# Patient Record
Sex: MALE | Race: BLACK OR AFRICAN AMERICAN | NOT HISPANIC OR LATINO | Employment: UNEMPLOYED | URBAN - METROPOLITAN AREA
[De-identification: names, ages, dates, MRNs, and addresses within clinical notes are randomized per-mention and may not be internally consistent; named-entity substitution may affect disease eponyms.]

---

## 2024-07-24 ENCOUNTER — CLINICAL SUPPORT (OUTPATIENT)
Dept: EMERGENCY MEDICINE | Facility: HOSPITAL | Age: 64
End: 2024-07-24
Payer: COMMERCIAL

## 2024-07-24 ENCOUNTER — HOSPITAL ENCOUNTER (OUTPATIENT)
Facility: HOSPITAL | Age: 64
Setting detail: OBSERVATION
Discharge: HOME | End: 2024-07-26
Attending: EMERGENCY MEDICINE | Admitting: INTERNAL MEDICINE
Payer: COMMERCIAL

## 2024-07-24 ENCOUNTER — APPOINTMENT (OUTPATIENT)
Dept: RADIOLOGY | Facility: HOSPITAL | Age: 64
End: 2024-07-24
Payer: COMMERCIAL

## 2024-07-24 DIAGNOSIS — I50.9 HEART FAILURE, UNSPECIFIED HF CHRONICITY, UNSPECIFIED HEART FAILURE TYPE (MULTI): ICD-10-CM

## 2024-07-24 DIAGNOSIS — R07.9 CHEST PAIN, UNSPECIFIED TYPE: Primary | ICD-10-CM

## 2024-07-24 LAB
ALBUMIN SERPL BCP-MCNC: 3.9 G/DL (ref 3.4–5)
ALP SERPL-CCNC: 93 U/L (ref 33–136)
ALT SERPL W P-5'-P-CCNC: 12 U/L (ref 10–52)
ANION GAP BLDV CALCULATED.4IONS-SCNC: 9 MMOL/L (ref 10–25)
ANION GAP SERPL CALC-SCNC: 14 MMOL/L
APTT PPP: 32 SECONDS (ref 27–38)
AST SERPL W P-5'-P-CCNC: 17 U/L (ref 9–39)
ATRIAL RATE: 59 BPM
ATRIAL RATE: 71 BPM
BASE EXCESS BLDV CALC-SCNC: 4.5 MMOL/L (ref -2–3)
BASOPHILS # BLD AUTO: 0.05 X10*3/UL (ref 0–0.1)
BASOPHILS NFR BLD AUTO: 0.7 %
BILIRUB SERPL-MCNC: 0.9 MG/DL (ref 0–1.2)
BODY TEMPERATURE: 37 DEGREES CELSIUS
BUN SERPL-MCNC: 14 MG/DL (ref 6–23)
CA-I BLDV-SCNC: 1.17 MMOL/L (ref 1.1–1.33)
CALCIUM SERPL-MCNC: 8.6 MG/DL (ref 8.6–10.6)
CARDIAC TROPONIN I PNL SERPL HS: 32 NG/L (ref 0–53)
CHLORIDE BLDV-SCNC: 100 MMOL/L (ref 98–107)
CHLORIDE SERPL-SCNC: 100 MMOL/L (ref 98–107)
CO2 SERPL-SCNC: 26 MMOL/L (ref 21–32)
CREAT SERPL-MCNC: 0.78 MG/DL (ref 0.5–1.3)
EGFRCR SERPLBLD CKD-EPI 2021: >90 ML/MIN/1.73M*2
EOSINOPHIL # BLD AUTO: 0.06 X10*3/UL (ref 0–0.7)
EOSINOPHIL NFR BLD AUTO: 0.9 %
ERYTHROCYTE [DISTWIDTH] IN BLOOD BY AUTOMATED COUNT: 17.2 % (ref 11.5–14.5)
GLUCOSE BLDV-MCNC: 92 MG/DL (ref 74–99)
GLUCOSE SERPL-MCNC: 110 MG/DL (ref 74–99)
HCO3 BLDV-SCNC: 29.2 MMOL/L (ref 22–26)
HCT VFR BLD AUTO: 40.5 % (ref 41–52)
HCT VFR BLD EST: 39 % (ref 41–52)
HGB BLD-MCNC: 13.1 G/DL (ref 13.5–17.5)
HGB BLDV-MCNC: 13.1 G/DL (ref 13.5–17.5)
IMM GRANULOCYTES # BLD AUTO: 0.02 X10*3/UL (ref 0–0.7)
IMM GRANULOCYTES NFR BLD AUTO: 0.3 % (ref 0–0.9)
INHALED O2 CONCENTRATION: 21 %
INR PPP: 1.4 (ref 0.9–1.1)
LACTATE BLDV-SCNC: 1.1 MMOL/L (ref 0.4–2)
LYMPHOCYTES # BLD AUTO: 2.08 X10*3/UL (ref 1.2–4.8)
LYMPHOCYTES NFR BLD AUTO: 30.7 %
MAGNESIUM SERPL-MCNC: 1.78 MG/DL (ref 1.6–2.4)
MCH RBC QN AUTO: 22.3 PG (ref 26–34)
MCHC RBC AUTO-ENTMCNC: 32.3 G/DL (ref 32–36)
MCV RBC AUTO: 69 FL (ref 80–100)
MONOCYTES # BLD AUTO: 0.77 X10*3/UL (ref 0.1–1)
MONOCYTES NFR BLD AUTO: 11.4 %
NEUTROPHILS # BLD AUTO: 3.79 X10*3/UL (ref 1.2–7.7)
NEUTROPHILS NFR BLD AUTO: 56 %
NRBC BLD-RTO: 0 /100 WBCS (ref 0–0)
OXYHGB MFR BLDV: 77.2 % (ref 45–75)
P AXIS: 41 DEGREES
P AXIS: 80 DEGREES
P OFFSET: 154 MS
P OFFSET: 179 MS
P ONSET: 95 MS
P ONSET: 99 MS
PCO2 BLDV: 43 MM HG (ref 41–51)
PH BLDV: 7.44 PH (ref 7.33–7.43)
PLATELET # BLD AUTO: 218 X10*3/UL (ref 150–450)
PO2 BLDV: 52 MM HG (ref 35–45)
POTASSIUM BLDV-SCNC: 2.6 MMOL/L (ref 3.5–5.3)
POTASSIUM SERPL-SCNC: 2.9 MMOL/L (ref 3.5–5.3)
PR INTERVAL: 230 MS
PR INTERVAL: 238 MS
PROT SERPL-MCNC: 7 G/DL (ref 6.4–8.2)
PROTHROMBIN TIME: 15.4 SECONDS (ref 9.8–12.8)
Q ONSET: 214 MS
Q ONSET: 214 MS
QRS COUNT: 11 BEATS
QRS COUNT: 12 BEATS
QRS DURATION: 130 MS
QRS DURATION: 130 MS
QT INTERVAL: 446 MS
QT INTERVAL: 448 MS
QTC CALCULATION(BAZETT): 483 MS
QTC CALCULATION(BAZETT): 484 MS
QTC FREDERICIA: 471 MS
QTC FREDERICIA: 472 MS
R AXIS: -17 DEGREES
R AXIS: -20 DEGREES
RBC # BLD AUTO: 5.87 X10*6/UL (ref 4.5–5.9)
SAO2 % BLDV: 79 % (ref 45–75)
SODIUM BLDV-SCNC: 136 MMOL/L (ref 136–145)
SODIUM SERPL-SCNC: 137 MMOL/L (ref 136–145)
T AXIS: 131 DEGREES
T AXIS: 139 DEGREES
T OFFSET: 437 MS
T OFFSET: 438 MS
VENTRICULAR RATE: 70 BPM
VENTRICULAR RATE: 71 BPM
WBC # BLD AUTO: 6.8 X10*3/UL (ref 4.4–11.3)

## 2024-07-24 PROCEDURE — 87635 SARS-COV-2 COVID-19 AMP PRB: CPT

## 2024-07-24 PROCEDURE — 83735 ASSAY OF MAGNESIUM: CPT

## 2024-07-24 PROCEDURE — 71046 X-RAY EXAM CHEST 2 VIEWS: CPT | Mod: FOREIGN READ | Performed by: RADIOLOGY

## 2024-07-24 PROCEDURE — 93005 ELECTROCARDIOGRAM TRACING: CPT

## 2024-07-24 PROCEDURE — 93010 ELECTROCARDIOGRAM REPORT: CPT | Performed by: EMERGENCY MEDICINE

## 2024-07-24 PROCEDURE — 85730 THROMBOPLASTIN TIME PARTIAL: CPT | Performed by: EMERGENCY MEDICINE

## 2024-07-24 PROCEDURE — 71046 X-RAY EXAM CHEST 2 VIEWS: CPT

## 2024-07-24 PROCEDURE — 99285 EMERGENCY DEPT VISIT HI MDM: CPT | Performed by: EMERGENCY MEDICINE

## 2024-07-24 PROCEDURE — 85610 PROTHROMBIN TIME: CPT | Performed by: EMERGENCY MEDICINE

## 2024-07-24 PROCEDURE — 84075 ASSAY ALKALINE PHOSPHATASE: CPT

## 2024-07-24 PROCEDURE — 36415 COLL VENOUS BLD VENIPUNCTURE: CPT

## 2024-07-24 PROCEDURE — 85025 COMPLETE CBC W/AUTO DIFF WBC: CPT

## 2024-07-24 PROCEDURE — 84484 ASSAY OF TROPONIN QUANT: CPT

## 2024-07-24 PROCEDURE — 36415 COLL VENOUS BLD VENIPUNCTURE: CPT | Performed by: EMERGENCY MEDICINE

## 2024-07-24 PROCEDURE — 83880 ASSAY OF NATRIURETIC PEPTIDE: CPT

## 2024-07-24 PROCEDURE — 84132 ASSAY OF SERUM POTASSIUM: CPT

## 2024-07-24 PROCEDURE — 99285 EMERGENCY DEPT VISIT HI MDM: CPT | Mod: CS

## 2024-07-24 RX ORDER — POTASSIUM CHLORIDE 14.9 MG/ML
20 INJECTION INTRAVENOUS
Status: COMPLETED | OUTPATIENT
Start: 2024-07-24 | End: 2024-07-25

## 2024-07-24 RX ORDER — POTASSIUM CHLORIDE 1.5 G/1.58G
40 POWDER, FOR SOLUTION ORAL ONCE
Status: COMPLETED | OUTPATIENT
Start: 2024-07-24 | End: 2024-07-25

## 2024-07-24 RX ORDER — MAGNESIUM SULFATE HEPTAHYDRATE 40 MG/ML
2 INJECTION, SOLUTION INTRAVENOUS ONCE
Status: COMPLETED | OUTPATIENT
Start: 2024-07-24 | End: 2024-07-25

## 2024-07-24 ASSESSMENT — LIFESTYLE VARIABLES
HAVE YOU EVER FELT YOU SHOULD CUT DOWN ON YOUR DRINKING: NO
HAVE PEOPLE ANNOYED YOU BY CRITICIZING YOUR DRINKING: NO
EVER HAD A DRINK FIRST THING IN THE MORNING TO STEADY YOUR NERVES TO GET RID OF A HANGOVER: NO
EVER FELT BAD OR GUILTY ABOUT YOUR DRINKING: NO
TOTAL SCORE: 0

## 2024-07-24 ASSESSMENT — COLUMBIA-SUICIDE SEVERITY RATING SCALE - C-SSRS
1. IN THE PAST MONTH, HAVE YOU WISHED YOU WERE DEAD OR WISHED YOU COULD GO TO SLEEP AND NOT WAKE UP?: NO
2. HAVE YOU ACTUALLY HAD ANY THOUGHTS OF KILLING YOURSELF?: NO
6. HAVE YOU EVER DONE ANYTHING, STARTED TO DO ANYTHING, OR PREPARED TO DO ANYTHING TO END YOUR LIFE?: NO

## 2024-07-24 ASSESSMENT — PAIN DESCRIPTION - ONSET: ONSET: ONGOING

## 2024-07-24 ASSESSMENT — PAIN DESCRIPTION - DESCRIPTORS
DESCRIPTORS: SHARP
DESCRIPTORS: SHARP

## 2024-07-24 ASSESSMENT — PAIN SCALES - GENERAL
PAINLEVEL_OUTOF10: 10 - WORST POSSIBLE PAIN
PAINLEVEL_OUTOF10: 10 - WORST POSSIBLE PAIN

## 2024-07-24 ASSESSMENT — PAIN DESCRIPTION - LOCATION: LOCATION: CHEST

## 2024-07-24 ASSESSMENT — PAIN DESCRIPTION - PAIN TYPE: TYPE: ACUTE PAIN

## 2024-07-24 ASSESSMENT — PAIN DESCRIPTION - DIRECTION: RADIATING_TOWARDS: LEFT ARM

## 2024-07-24 ASSESSMENT — PAIN DESCRIPTION - ORIENTATION: ORIENTATION: MID

## 2024-07-24 ASSESSMENT — PAIN - FUNCTIONAL ASSESSMENT: PAIN_FUNCTIONAL_ASSESSMENT: 0-10

## 2024-07-25 ENCOUNTER — APPOINTMENT (OUTPATIENT)
Dept: RADIOLOGY | Facility: HOSPITAL | Age: 64
End: 2024-07-25
Payer: COMMERCIAL

## 2024-07-25 ENCOUNTER — CLINICAL SUPPORT (OUTPATIENT)
Dept: EMERGENCY MEDICINE | Facility: HOSPITAL | Age: 64
End: 2024-07-25
Payer: COMMERCIAL

## 2024-07-25 ENCOUNTER — APPOINTMENT (OUTPATIENT)
Dept: CARDIOLOGY | Facility: HOSPITAL | Age: 64
End: 2024-07-25
Payer: COMMERCIAL

## 2024-07-25 PROBLEM — R07.9 CHEST PAIN: Status: ACTIVE | Noted: 2024-07-25

## 2024-07-25 LAB
ANION GAP BLDV CALCULATED.4IONS-SCNC: 9 MMOL/L (ref 10–25)
AORTIC VALVE MEAN GRADIENT: 1.9 MMHG
AORTIC VALVE PEAK VELOCITY: 0.89 M/S
ATRIAL RATE: 66 BPM
AV PEAK GRADIENT: 3.1 MMHG
AVA (PEAK VEL): 3.08 CM2
AVA (VTI): 3.27 CM2
BASE EXCESS BLDV CALC-SCNC: 3 MMOL/L (ref -2–3)
BNP SERPL-MCNC: 137 PG/ML (ref 0–99)
BODY TEMPERATURE: 37 DEGREES CELSIUS
CA-I BLDV-SCNC: 1.14 MMOL/L (ref 1.1–1.33)
CARDIAC TROPONIN I PNL SERPL HS: 33 NG/L (ref 0–53)
CARDIAC TROPONIN I PNL SERPL HS: 35 NG/L (ref 0–53)
CHLORIDE BLDV-SCNC: 102 MMOL/L (ref 98–107)
EJECTION FRACTION APICAL 4 CHAMBER: 7
EJECTION FRACTION: 21 %
GLUCOSE BLDV-MCNC: 94 MG/DL (ref 74–99)
HCO3 BLDV-SCNC: 27.9 MMOL/L (ref 22–26)
HCT VFR BLD EST: 38 % (ref 41–52)
HGB BLDV-MCNC: 12.7 G/DL (ref 13.5–17.5)
INHALED O2 CONCENTRATION: 21 %
LACTATE BLDV-SCNC: 1 MMOL/L (ref 0.4–2)
LEFT ATRIUM VOLUME AREA LENGTH INDEX BSA: 53.9 ML/M2
LEFT VENTRICLE INTERNAL DIMENSION DIASTOLE: 5.65 CM (ref 3.5–6)
LEFT VENTRICULAR OUTFLOW TRACT DIAMETER: 2.12 CM
MITRAL VALVE E/A RATIO: 1.32
MITRAL VALVE E/E' RATIO: 23.55
OXYHGB MFR BLDV: 72.8 % (ref 45–75)
P AXIS: 97 DEGREES
P OFFSET: 130 MS
P ONSET: 58 MS
PCO2 BLDV: 43 MM HG (ref 41–51)
PH BLDV: 7.42 PH (ref 7.33–7.43)
PO2 BLDV: 48 MM HG (ref 35–45)
POTASSIUM BLDV-SCNC: 3.6 MMOL/L (ref 3.5–5.3)
POTASSIUM SERPL-SCNC: 3.5 MMOL/L (ref 3.5–5.3)
PR INTERVAL: 310 MS
Q ONSET: 213 MS
QRS COUNT: 11 BEATS
QRS DURATION: 122 MS
QT INTERVAL: 448 MS
QTC CALCULATION(BAZETT): 469 MS
QTC FREDERICIA: 462 MS
R AXIS: 87 DEGREES
RIGHT VENTRICLE FREE WALL PEAK S': 7 CM/S
SAO2 % BLDV: 75 % (ref 45–75)
SARS-COV-2 RNA RESP QL NAA+PROBE: NOT DETECTED
SODIUM BLDV-SCNC: 135 MMOL/L (ref 136–145)
T AXIS: -83 DEGREES
T OFFSET: 437 MS
TRICUSPID ANNULAR PLANE SYSTOLIC EXCURSION: 1.8 CM
VENTRICULAR RATE: 66 BPM

## 2024-07-25 PROCEDURE — 96372 THER/PROPH/DIAG INJ SC/IM: CPT

## 2024-07-25 PROCEDURE — 93306 TTE W/DOPPLER COMPLETE: CPT | Performed by: STUDENT IN AN ORGANIZED HEALTH CARE EDUCATION/TRAINING PROGRAM

## 2024-07-25 PROCEDURE — 93005 ELECTROCARDIOGRAM TRACING: CPT

## 2024-07-25 PROCEDURE — 96366 THER/PROPH/DIAG IV INF ADDON: CPT

## 2024-07-25 PROCEDURE — 74174 CTA ABD&PLVS W/CONTRAST: CPT | Performed by: RADIOLOGY

## 2024-07-25 PROCEDURE — 96365 THER/PROPH/DIAG IV INF INIT: CPT | Mod: 59

## 2024-07-25 PROCEDURE — 84484 ASSAY OF TROPONIN QUANT: CPT

## 2024-07-25 PROCEDURE — 2550000001 HC RX 255 CONTRASTS: Performed by: EMERGENCY MEDICINE

## 2024-07-25 PROCEDURE — 2500000001 HC RX 250 WO HCPCS SELF ADMINISTERED DRUGS (ALT 637 FOR MEDICARE OP)

## 2024-07-25 PROCEDURE — 72125 CT NECK SPINE W/O DYE: CPT

## 2024-07-25 PROCEDURE — 84132 ASSAY OF SERUM POTASSIUM: CPT

## 2024-07-25 PROCEDURE — 71275 CT ANGIOGRAPHY CHEST: CPT | Performed by: RADIOLOGY

## 2024-07-25 PROCEDURE — 93010 ELECTROCARDIOGRAM REPORT: CPT | Performed by: PHYSICIAN ASSISTANT

## 2024-07-25 PROCEDURE — 2500000004 HC RX 250 GENERAL PHARMACY W/ HCPCS (ALT 636 FOR OP/ED)

## 2024-07-25 PROCEDURE — G0378 HOSPITAL OBSERVATION PER HR: HCPCS

## 2024-07-25 PROCEDURE — 93306 TTE W/DOPPLER COMPLETE: CPT

## 2024-07-25 PROCEDURE — 72125 CT NECK SPINE W/O DYE: CPT | Performed by: RADIOLOGY

## 2024-07-25 PROCEDURE — 2500000002 HC RX 250 W HCPCS SELF ADMINISTERED DRUGS (ALT 637 FOR MEDICARE OP, ALT 636 FOR OP/ED)

## 2024-07-25 PROCEDURE — 36415 COLL VENOUS BLD VENIPUNCTURE: CPT

## 2024-07-25 PROCEDURE — 97162 PT EVAL MOD COMPLEX 30 MIN: CPT | Mod: GP

## 2024-07-25 PROCEDURE — 96375 TX/PRO/DX INJ NEW DRUG ADDON: CPT | Mod: 59

## 2024-07-25 PROCEDURE — 70450 CT HEAD/BRAIN W/O DYE: CPT

## 2024-07-25 PROCEDURE — 71275 CT ANGIOGRAPHY CHEST: CPT

## 2024-07-25 PROCEDURE — 2500000005 HC RX 250 GENERAL PHARMACY W/O HCPCS

## 2024-07-25 PROCEDURE — 70450 CT HEAD/BRAIN W/O DYE: CPT | Performed by: RADIOLOGY

## 2024-07-25 PROCEDURE — 96361 HYDRATE IV INFUSION ADD-ON: CPT

## 2024-07-25 PROCEDURE — 96368 THER/DIAG CONCURRENT INF: CPT

## 2024-07-25 PROCEDURE — 96376 TX/PRO/DX INJ SAME DRUG ADON: CPT | Mod: 59

## 2024-07-25 RX ORDER — ATORVASTATIN CALCIUM 80 MG/1
80 TABLET, FILM COATED ORAL DAILY
COMMUNITY

## 2024-07-25 RX ORDER — ONDANSETRON HYDROCHLORIDE 2 MG/ML
4 INJECTION, SOLUTION INTRAVENOUS EVERY 6 HOURS PRN
Status: DISCONTINUED | OUTPATIENT
Start: 2024-07-25 | End: 2024-07-26 | Stop reason: HOSPADM

## 2024-07-25 RX ORDER — CARVEDILOL 3.12 MG/1
3.12 TABLET ORAL 2 TIMES DAILY
Status: DISCONTINUED | OUTPATIENT
Start: 2024-07-25 | End: 2024-07-26 | Stop reason: HOSPADM

## 2024-07-25 RX ORDER — FUROSEMIDE 40 MG/1
20 TABLET ORAL
Status: DISCONTINUED | OUTPATIENT
Start: 2024-07-25 | End: 2024-07-26 | Stop reason: HOSPADM

## 2024-07-25 RX ORDER — ENOXAPARIN SODIUM 100 MG/ML
40 INJECTION SUBCUTANEOUS EVERY 24 HOURS
Status: DISCONTINUED | OUTPATIENT
Start: 2024-07-25 | End: 2024-07-26 | Stop reason: HOSPADM

## 2024-07-25 RX ORDER — CARVEDILOL 3.12 MG/1
3.12 TABLET ORAL 2 TIMES DAILY
COMMUNITY

## 2024-07-25 RX ORDER — FUROSEMIDE 40 MG/1
20 TABLET ORAL
Status: DISCONTINUED | OUTPATIENT
Start: 2024-07-25 | End: 2024-07-25

## 2024-07-25 RX ORDER — MAGNESIUM OXIDE 420 MG/1
1 TABLET ORAL DAILY
COMMUNITY

## 2024-07-25 RX ORDER — ACETAMINOPHEN 325 MG/1
975 TABLET ORAL EVERY 8 HOURS PRN
Status: DISCONTINUED | OUTPATIENT
Start: 2024-07-25 | End: 2024-07-26 | Stop reason: HOSPADM

## 2024-07-25 RX ORDER — NITROGLYCERIN 0.4 MG/1
0.4 TABLET SUBLINGUAL ONCE
Status: COMPLETED | OUTPATIENT
Start: 2024-07-25 | End: 2024-07-25

## 2024-07-25 RX ORDER — POTASSIUM CHLORIDE 20 MEQ/1
20 TABLET, EXTENDED RELEASE ORAL DAILY
Status: DISCONTINUED | OUTPATIENT
Start: 2024-07-25 | End: 2024-07-26 | Stop reason: HOSPADM

## 2024-07-25 RX ORDER — CHOLECALCIFEROL (VITAMIN D3) 50 MCG
50 TABLET ORAL DAILY
COMMUNITY

## 2024-07-25 RX ORDER — ONDANSETRON HYDROCHLORIDE 2 MG/ML
4 INJECTION, SOLUTION INTRAVENOUS ONCE
Status: COMPLETED | OUTPATIENT
Start: 2024-07-26 | End: 2024-07-26

## 2024-07-25 RX ORDER — FUROSEMIDE 20 MG/1
20 TABLET ORAL
Status: ON HOLD | COMMUNITY
End: 2024-07-26

## 2024-07-25 RX ORDER — POLYETHYLENE GLYCOL 3350 17 G/17G
17 POWDER, FOR SOLUTION ORAL DAILY
Status: DISCONTINUED | OUTPATIENT
Start: 2024-07-26 | End: 2024-07-26 | Stop reason: HOSPADM

## 2024-07-25 RX ORDER — TAMSULOSIN HYDROCHLORIDE 0.4 MG/1
0.4 CAPSULE ORAL DAILY
Status: DISCONTINUED | OUTPATIENT
Start: 2024-07-25 | End: 2024-07-25

## 2024-07-25 RX ORDER — ATORVASTATIN CALCIUM 80 MG/1
80 TABLET, FILM COATED ORAL DAILY
Status: DISCONTINUED | OUTPATIENT
Start: 2024-07-25 | End: 2024-07-25 | Stop reason: SDUPTHER

## 2024-07-25 RX ORDER — TAMSULOSIN HYDROCHLORIDE 0.4 MG/1
0.4 CAPSULE ORAL NIGHTLY
COMMUNITY

## 2024-07-25 RX ORDER — LIDOCAINE 560 MG/1
1 PATCH PERCUTANEOUS; TOPICAL; TRANSDERMAL DAILY
Status: DISCONTINUED | OUTPATIENT
Start: 2024-07-25 | End: 2024-07-26 | Stop reason: HOSPADM

## 2024-07-25 RX ORDER — FUROSEMIDE 10 MG/ML
20 INJECTION INTRAMUSCULAR; INTRAVENOUS 2 TIMES DAILY
Status: DISCONTINUED | OUTPATIENT
Start: 2024-07-25 | End: 2024-07-25

## 2024-07-25 RX ORDER — NITROGLYCERIN 0.4 MG/1
0.4 TABLET SUBLINGUAL EVERY 5 MIN PRN
Status: DISCONTINUED | OUTPATIENT
Start: 2024-07-25 | End: 2024-07-25

## 2024-07-25 RX ORDER — FUROSEMIDE 40 MG/1
TABLET ORAL
Status: COMPLETED
Start: 2024-07-25 | End: 2024-07-25

## 2024-07-25 RX ORDER — SACUBITRIL AND VALSARTAN 24; 26 MG/1; MG/1
1 TABLET, FILM COATED ORAL 2 TIMES DAILY
COMMUNITY

## 2024-07-25 RX ORDER — ASPIRIN 81 MG/1
81 TABLET ORAL DAILY
Status: DISCONTINUED | OUTPATIENT
Start: 2024-07-25 | End: 2024-07-26 | Stop reason: HOSPADM

## 2024-07-25 RX ORDER — AMOXICILLIN 250 MG
2 CAPSULE ORAL NIGHTLY PRN
Status: DISCONTINUED | OUTPATIENT
Start: 2024-07-25 | End: 2024-07-26 | Stop reason: HOSPADM

## 2024-07-25 RX ORDER — ATORVASTATIN CALCIUM 80 MG/1
80 TABLET, FILM COATED ORAL NIGHTLY
Status: DISCONTINUED | OUTPATIENT
Start: 2024-07-25 | End: 2024-07-26 | Stop reason: HOSPADM

## 2024-07-25 RX ORDER — ACETAMINOPHEN 325 MG/1
975 TABLET ORAL ONCE
Status: COMPLETED | OUTPATIENT
Start: 2024-07-25 | End: 2024-07-25

## 2024-07-25 RX ORDER — THYROID 60 MG/1
60 TABLET ORAL
Status: DISCONTINUED | OUTPATIENT
Start: 2024-07-26 | End: 2024-07-25 | Stop reason: ENTERED-IN-ERROR

## 2024-07-25 RX ORDER — CARVEDILOL 3.12 MG/1
3.12 TABLET ORAL 2 TIMES DAILY
Status: DISCONTINUED | OUTPATIENT
Start: 2024-07-25 | End: 2024-07-25 | Stop reason: SDUPTHER

## 2024-07-25 RX ORDER — FLUTICASONE PROPIONATE 50 MCG
1 SPRAY, SUSPENSION (ML) NASAL DAILY PRN
COMMUNITY
End: 2024-07-25 | Stop reason: WASHOUT

## 2024-07-25 RX ORDER — SPIRONOLACTONE 25 MG/1
12.5 TABLET ORAL DAILY
COMMUNITY

## 2024-07-25 RX ORDER — SPIRONOLACTONE 25 MG/1
12.5 TABLET ORAL DAILY
Status: DISCONTINUED | OUTPATIENT
Start: 2024-07-25 | End: 2024-07-25 | Stop reason: SDUPTHER

## 2024-07-25 RX ORDER — FUROSEMIDE 20 MG/1
20 TABLET ORAL
Status: DISCONTINUED | OUTPATIENT
Start: 2024-07-25 | End: 2024-07-25

## 2024-07-25 RX ORDER — MORPHINE SULFATE 4 MG/ML
2 INJECTION INTRAVENOUS ONCE
Status: COMPLETED | OUTPATIENT
Start: 2024-07-25 | End: 2024-07-25

## 2024-07-25 RX ORDER — ACETAMINOPHEN AND CODEINE PHOSPHATE 300; 30 MG/1; MG/1
TABLET ORAL
COMMUNITY

## 2024-07-25 RX ORDER — SPIRONOLACTONE 25 MG/1
12.5 TABLET ORAL DAILY
Status: DISCONTINUED | OUTPATIENT
Start: 2024-07-25 | End: 2024-07-26 | Stop reason: HOSPADM

## 2024-07-25 RX ORDER — POTASSIUM CHLORIDE 20 MEQ/1
20 TABLET, EXTENDED RELEASE ORAL DAILY
Status: ON HOLD | COMMUNITY
End: 2024-07-26

## 2024-07-25 RX ORDER — ASPIRIN 81 MG/1
81 TABLET ORAL DAILY
COMMUNITY

## 2024-07-25 RX ORDER — OXYCODONE HYDROCHLORIDE 5 MG/1
5 TABLET ORAL EVERY 6 HOURS PRN
Status: DISCONTINUED | OUTPATIENT
Start: 2024-07-25 | End: 2024-07-26 | Stop reason: HOSPADM

## 2024-07-25 RX ORDER — NAPROXEN 500 MG/1
500 TABLET ORAL 2 TIMES DAILY PRN
COMMUNITY

## 2024-07-25 RX ORDER — TAMSULOSIN HYDROCHLORIDE 0.4 MG/1
0.4 CAPSULE ORAL NIGHTLY
Status: DISCONTINUED | OUTPATIENT
Start: 2024-07-25 | End: 2024-07-26 | Stop reason: HOSPADM

## 2024-07-25 SDOH — SOCIAL STABILITY: SOCIAL INSECURITY: DO YOU FEEL UNSAFE GOING BACK TO THE PLACE WHERE YOU ARE LIVING?: NO

## 2024-07-25 SDOH — SOCIAL STABILITY: SOCIAL INSECURITY: DOES ANYONE TRY TO KEEP YOU FROM HAVING/CONTACTING OTHER FRIENDS OR DOING THINGS OUTSIDE YOUR HOME?: NO

## 2024-07-25 SDOH — SOCIAL STABILITY: SOCIAL INSECURITY: DO YOU FEEL ANYONE HAS EXPLOITED OR TAKEN ADVANTAGE OF YOU FINANCIALLY OR OF YOUR PERSONAL PROPERTY?: NO

## 2024-07-25 SDOH — SOCIAL STABILITY: SOCIAL INSECURITY: HAVE YOU HAD ANY THOUGHTS OF HARMING ANYONE ELSE?: NO

## 2024-07-25 SDOH — SOCIAL STABILITY: SOCIAL INSECURITY: ABUSE: ADULT

## 2024-07-25 SDOH — SOCIAL STABILITY: SOCIAL INSECURITY: ARE THERE ANY APPARENT SIGNS OF INJURIES/BEHAVIORS THAT COULD BE RELATED TO ABUSE/NEGLECT?: NO

## 2024-07-25 SDOH — SOCIAL STABILITY: SOCIAL INSECURITY: HAS ANYONE EVER THREATENED TO HURT YOUR FAMILY OR YOUR PETS?: NO

## 2024-07-25 SDOH — SOCIAL STABILITY: SOCIAL INSECURITY: ARE YOU OR HAVE YOU BEEN THREATENED OR ABUSED PHYSICALLY, EMOTIONALLY, OR SEXUALLY BY ANYONE?: NO

## 2024-07-25 ASSESSMENT — ENCOUNTER SYMPTOMS
DIZZINESS: 0
CONSTIPATION: 0
ACTIVITY CHANGE: 0
SORE THROAT: 0
FEVER: 0
LIGHT-HEADEDNESS: 0
VOMITING: 1
SHORTNESS OF BREATH: 0
DYSURIA: 0
NAUSEA: 1
UNEXPECTED WEIGHT CHANGE: 0
COUGH: 0
CHEST TIGHTNESS: 0
HEADACHES: 0
ABDOMINAL PAIN: 0
CHILLS: 0
BLOOD IN STOOL: 0
DIARRHEA: 0
FREQUENCY: 0
POLYDIPSIA: 0

## 2024-07-25 ASSESSMENT — PAIN SCALES - GENERAL
PAINLEVEL_OUTOF10: 10 - WORST POSSIBLE PAIN
PAINLEVEL_OUTOF10: 10 - WORST POSSIBLE PAIN
PAINLEVEL_OUTOF10: 5 - MODERATE PAIN
PAINLEVEL_OUTOF10: 0 - NO PAIN
PAINLEVEL_OUTOF10: 10 - WORST POSSIBLE PAIN
PAINLEVEL_OUTOF10: 10 - WORST POSSIBLE PAIN
PAINLEVEL_OUTOF10: 6

## 2024-07-25 ASSESSMENT — COGNITIVE AND FUNCTIONAL STATUS - GENERAL
TOILETING: A LITTLE
WALKING IN HOSPITAL ROOM: A LITTLE
MOVING FROM LYING ON BACK TO SITTING ON SIDE OF FLAT BED WITH BEDRAILS: A LITTLE
STANDING UP FROM CHAIR USING ARMS: A LITTLE
MOVING TO AND FROM BED TO CHAIR: A LITTLE
DRESSING REGULAR UPPER BODY CLOTHING: A LITTLE
STANDING UP FROM CHAIR USING ARMS: A LITTLE
CLIMB 3 TO 5 STEPS WITH RAILING: A LITTLE
DRESSING REGULAR LOWER BODY CLOTHING: A LITTLE
TURNING FROM BACK TO SIDE WHILE IN FLAT BAD: A LITTLE
MOBILITY SCORE: 22
MOBILITY SCORE: 18
DAILY ACTIVITIY SCORE: 21
CLIMB 3 TO 5 STEPS WITH RAILING: A LITTLE

## 2024-07-25 ASSESSMENT — HEART SCORE
RISK FACTORS: >2 RISK FACTORS OR HX OF ATHEROSCLEROTIC DISEASE
ECG: NON-SPECIFIC REPOLARIZATION DISTURBANCE
HISTORY: SLIGHTLY SUSPICIOUS
AGE: 65+
TROPONIN: LESS THAN OR EQUAL TO NORMAL LIMIT
HEART SCORE: 5

## 2024-07-25 ASSESSMENT — PAIN - FUNCTIONAL ASSESSMENT
PAIN_FUNCTIONAL_ASSESSMENT: 0-10

## 2024-07-25 ASSESSMENT — ACTIVITIES OF DAILY LIVING (ADL)
FEEDING YOURSELF: INDEPENDENT
HEARING - RIGHT EAR: FUNCTIONAL
PATIENT'S MEMORY ADEQUATE TO SAFELY COMPLETE DAILY ACTIVITIES?: YES
TOILETING: INDEPENDENT
GROOMING: INDEPENDENT
DRESSING YOURSELF: INDEPENDENT
BATHING: INDEPENDENT
WALKS IN HOME: INDEPENDENT
JUDGMENT_ADEQUATE_SAFELY_COMPLETE_DAILY_ACTIVITIES: YES
ADEQUATE_TO_COMPLETE_ADL: YES
ADL_ASSISTANCE: INDEPENDENT
ADEQUATE_TO_COMPLETE_ADL: YES
HEARING - LEFT EAR: FUNCTIONAL

## 2024-07-25 ASSESSMENT — PAIN DESCRIPTION - LOCATION
LOCATION: CHEST
LOCATION: CHEST

## 2024-07-25 ASSESSMENT — PAIN DESCRIPTION - ORIENTATION: ORIENTATION: MID

## 2024-07-25 NOTE — HOSPITAL COURSE
Nigel Chew is a 63 y.o. 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 Patient had a 1-week history of substernal chest pain with associated nausea/vomiting and one episode of syncope. Patient reports not eating and drinking well over the past week and has not been taking home medications for that same time. Presentation and workup non-concerning for ACS with negative troponin and EKG without acute ischemic changes. Patient has a history of HrEF diagnosed in 12/2023.  LHC at the same time revealed no obstructive or nonobstructive CAD.  TTE 12/2023 with an EF of 25-30%, global ventricular hypokinesis, and moderate right atrial dilatation. [facility]: predominant rhythm was sinus rhythm. First degree AV block was present. Non sustained VT. Non sustained SVT. Frequent, isolated PACs. Frequent, isolated PVCs (7.9%).    Physical exam on admission significant for pain upon palpation of the sternum without signs of heart failure exacerbation such as lower extremity edema, weight gain, and JVD. . CTA showing cardiomegaly and small pericardial effusion. TTE showing EF 21%, global hypokinesis, and moderate pericardial effusion without concerns for cardiac tamponade.  Concerns for dehydration upon admission given positive orthostatics.     On the floor, patient was given 500 mL bolus LR due to concerns for dehydration with positive orthostatics. Home Lasix was held. Restarted home GDMT. Pain persisted throughout the night. Cardiac MRI ordered 7/26, showing extensive delayed enhancement which predominantly appears to be mid myocardial throughout the mid to basal segments as described and greatest within the lateral wall overall in keeping with a nonspecific pattern. Some areas  of the delayed enhancement do appear to involve the subendocardium in the lateral wall and areas of infarction are difficult to entirely exclude. The diffusely increased T1 mapping times and upper limits of normal to borderline increased T2 mapping times are suggestive of an underlying infiltrative process. Approximate scar size = 21%.    Patient's chest pain began to gradually improve on analgesics. No syncopal or pre-syncopal events throughout his admission. Patient was medically stable and appropriate for discharge. Patient agreeable to discharge and expresses agreement to follow-up with outpatient providers.

## 2024-07-25 NOTE — SIGNIFICANT EVENT
Senior Staffing Note    History Of Present Illness  Nigel Chew is a 63 y.o. male with PMH of HFrEF (25-30% [facility]; 2/2 NIC), Hx of CVA 2012 with left sided weakness who is admitted to cardiology for syncope and chest pain.    Patient reports that he had sudden onset chest pain 1 week ago. It is in the middle of his chest, not related to exertion, reproducible on palpation. He also had N/V. He said the pain is similar to the pain he had when [facility] was [facility], when he LHC showed normal coronaries. He said the pain back then resolved spontaneously.     He reports having nausea and that as a result has not been drinking or eating much. He also has not been his meds. He reported yesterday he tried to sit up from a seating position, lost consciousness and fell, did no his head. Not on blood thinners. Denies palpitations or shortness.     His current weight is 201lbs, dry weight is 205lbs per [facility] records.     ED Course:  VS: T 36.9  /105 HR 71 RR 14 O2 97 on RA    Labs:  CBC WBC 6.8 Hb 13.1 Plt 218  RFP Na 137 K 2.9 (repeat 3.5) Cl 100 HCO3 26 BUN 14 SCr 0.78 Mg 1.78  LFTs AST/ALT 17/12 Alk Phos 93 T Bili 0.9   Trop 32->35    Imaging:   EKG sinus with first degree AVB (HR 66), T-wave inversions in inferior and lateral leads, no significant changes compared to [facility]    CXR No acute process.    CT HEAD:  1. No acute intracranial abnormality or calvarial fracture.  2. Large area of encephalomalacia within the right MCA distribution,  consistent with history of prior stroke.    CT CERVICAL SPINE:  1. No acute fracture or traumatic malalignment of the cervical spine.  2. Multilevel degenerative changes of the cervical spine as described  above.    CTA CAP:  IMPRESSION:  No thoracic or abdominal aortic aneurysm or acute aortic pathology.    Enlarged main pulmonary artery measuring up to 3.2 cm. Findings may  be seen with pulmonary arterial hypertension.    Cardiomegaly and small  pericardial effusion.    Thickened interlobular septal markings and scattered ground-glass  opacities favored to represent pulmonary edema. Superimposed  infectious or inflammatory processes are not excluded.    Partially enhancing hepatic lesions which may represent hemangiomas,  however, are incompletely characterize. Recommend nonemergent MRI to  further characterize.    Age indeterminate compression deformity of the L5 vertebral body with  irregularity of the inferior endplate. Correlate with point  tenderness.    TTE:  1. The left ventricular systolic function is severely decreased, with a Trujillo's biplane calculated ejection fraction of 21%.  2. There is global hypokinesis of the left ventricle with minor regional variations.  3. Spectral Doppler shows a pseudonormal pattern of left ventricular diastolic filling.  4. Left ventricular cavity size is mild to moderately dilated.  5. Mildly enlarged right ventricle.  6. There is mildly reduced right ventricular systolic function.  7. The left atrium is severely dilated.  8. There is a moderate pericardial effusion.    Interventions:   Potassium 40, mag 2mg    Cardiac Hx:  [facility]; LHC was normal; TTE showed EF of 25-30%.           Physical Exam  General: awake, alert, conversant, appears stated age  Cardiac: RRR, normal S1, S2, no M/R/G  Pulm: CTAB, normal respiratory effort, on room air  Abdomen: soft, ND, NT, no involuntary guarding or rebound tenderness  : no flank pain or indwelling urinary catheter  EXT: no peripheral edema, no asymmetry noted  Neuro: AOx3, complete left hemiparesis   Psych: coherent thought process, appropriate mood and affect      Last Recorded Vitals  /88 (Patient Position: Lying)   Pulse 82   Temp 36.5 °C (97.7 °F) (Oral)   Resp 17   Wt 91.2 kg (201 lb)   SpO2 97%          Assessment/Plan   Principal Problem:    Chest pain    Nigel Chew is a 63 y.o. male with PMH of HFrEF (25-30% [facility];  2/2 NICM), Hx of CVA 2012 with left sided weakness who is admitted to cardiology for syncope and chest pain. Pain description, in addition to normal troponin and EKG, recent normal LHC, likely indicative of costochondritis, but will make NPO for possible ischemic eval tomorrow. For syncope, patient has been having nausea and appears dry on exam, will check orthostats and give gentle hydration.     #Chest pain  ::[facility]: normal coronaries, EKG no changes, trop 32->35->33  ::reproducible on palpation, not related to exertion  -lidocaine patches, Tylenol, PRN oxycodone for severe pain  -NPO for possible ischemic eval tomorrow    #Syncope  ::Appears dry on exam, TTE with small and collapsible IVC  ::poor PO intake recently d/t nausea  ::Likely orthostatic syncope  ::noted to have moderate pericardial effusion on TTE but no reported signs of tamponade  -check orthostatic vitals  -gentle hydration    #HFrEF  ::EF here 20%, [facility] 25-30% [facility] 12[facility]  ::[facility] 12/2023: normal  -resume home GDMT carvedilol 3.125mg, entresto 24/26, spironolactone 12.5mg, jardiance  -consider cMRI     #Hepatic lesion  ::Partially enhancing hepatic lesions which may represent hemangiomas, however, are incompletely characterize.   -Recommend nonemergent MRI to further characterize.    Code status: Full Code (confirmed on admission)  NOK: LIZETMINAJOSE [phone]    To be formally staffed in the AM    Eusebio Ayon MD

## 2024-07-25 NOTE — PROGRESS NOTES
Physical Therapy    Physical Therapy Evaluation    Patient Name: Nigel Chew  MRN: [id number]  Today's Date: 7/25/2024   Time Calculation  Start Time: 1436  Stop Time: 1452  Time Calculation (min): 16 min    Assessment/Plan   PT Assessment  Medical Staff Made Aware: Yes  End of Session Communication: Bedside nurse  Assessment Comment: Patient is a 62yo M presenting with chest pain. Patient lives with family and uses cane for short distances at home. Patient reports feeling at his baseline and is not interested in further PT at this time.  End of Session Patient Position: Bed, 3 rail up  IP OR SWING BED PT PLAN  Inpatient or Swing Bed: Inpatient  PT Plan  PT Plan: PT Eval only  PT Eval Only Reason: At baseline function  PT Frequency: PT eval only  PT Discharge Recommendations: No further acute PT  PT - OK to Discharge: Yes      Subjective   General Visit Information:  General  Reason for Referral: chest pain  Past Medical History Relevant to Rehab: hypertension, hyperlipidemia, prior CVA 2012 with residual left sided weakness  Prior to Session Communication: Bedside nurse  Patient Position Received: Bed, 3 rail up  General Comment: pt agreeable to therapy and RN cleared  Home Living:  Home Living  Type of Home: House  Lives With:  (3 sons)  Home Adaptive Equipment: Cane, Wheelchair-power  Home Layout: One level  Home Access: Stairs to enter with rails  Entrance Stairs-Number of Steps: 6  Bathroom Shower/Tub: Tub/shower unit  Bathroom Equipment: Grab bars in shower  Home Living Comments: - drive, youngest dtr will drive pt to appointments or store  Prior Level of Function:  Prior Function Per Pt/Caregiver Report  Level of [place]: Independent with ADLs and functional transfers, Independent with homemaking with ambulation  ADL Assistance: Independent  Homemaking Assistance: Independent  Ambulatory Assistance: Independent (cane and AFO, power chair for community)  Prior Function Comments: 5 falls in last 6  months,  Precautions:  Precautions  Medical Precautions: Fall precautions  Vital Signs:  Vital Signs  Heart Rate: 74    Objective   Pain:  Pain Assessment  Pain Assessment: 0-10  0-10 (Numeric) Pain Score: 10 - Worst possible pain  Pain Type: Acute pain  Pain Location:  (chest and R low back)  Cognition:  Cognition  Overall Cognitive Status: Within Functional Limits (flat)  Orientation Level: Oriented X4    General Assessments:                  Activity Tolerance  Endurance: Endurance does not limit participation in activity    Sensation  Light Touch:  (reports whole L side is numb)    Static Sitting Balance  Static Sitting-Level of Assistance: Close supervision  Static Sitting-Comment/Number of Minutes: 5 min  Dynamic Sitting Balance  Dynamic Sitting-Balance: Forward lean  Dynamic Sitting-Comments: don AFO and shoes, SBA  Functional Assessments:  Bed Mobility  Bed Mobility: Yes  Bed Mobility 1  Bed Mobility 1: Supine to sitting, Sitting to supine  Level of Assistance 1: Close supervision    Transfers  Transfer: Yes  Transfer 1  Transfer From 1: Sit to, Stand to  Transfer to 1: Stand, Sit  Technique 1: Sit to stand, Stand to sit  Transfer Device 1: Cane  Transfer Level of Assistance 1: Close supervision  Trials/Comments 1: stood from EOB    Ambulation/Gait Training  Ambulation/Gait Training Performed: Yes  Ambulation/Gait Training 1  Surface 1: Level tile  Device 1: Single point cane  Assistance 1: Close supervision  Quality of Gait 1: Decreased step length, Foot drop/steppage gait (mild buckle on L knee)  Comments/Distance (ft) 1: ambulated 80', brief standing rest break and then ambulated 30' back to room  Extremity/Trunk Assessments:  RLE   RLE : Within Functional Limits  LLE   LLE : knee 3/5  Outcome Measures:  [facility] Basic Mobility  Turning from your back to your side while in a flat bed without using bedrails: A little  Moving from lying on your back to sitting on the side of a flat bed without using bedrails:  A little  Moving to and from bed to chair (including a wheelchair): A little  Standing up from a chair using your arms (e.g. wheelchair or bedside chair): A little  To walk in hospital room: A little  Climbing 3-5 steps with railing: A little  Basic Mobility - Total Score: 18        Education Documentation  No documentation found.  Education Comments  No comments found.

## 2024-07-25 NOTE — H&P
History and Physical  [facility] EMERGENCY MEDICINE  Patient: Nigel Chew  MRN: [id number]  : 1960  Date of Evaluation: 2024  ED Provider: Betzy Bishop PA-C      Patient History:  Nigel Chew is a 63 y.o. male with a past medical history significant for hypertension, hyperlipidemia, prior CVA  with residual left sided weakness who presents to the emergency department complaining of progressively worsening chest pain.  Patient states that for the past week he has been experiencing non radiating, burning pain at the center of his chest, rated 10/10. Denies any aggravating or alleviating factors.  He also endorses nausea with multiple episodes of non bloody, bilious vomiting. No urinary symptoms or diarrhea. States he has never experienced pain like this before. Patient reports that due to his nausea he has not taken any of his home mediations for the past week as he was afraid he might throw them up.     ED team collected CBC and CMP which was significant for low potassium at 2.9, no leukocytosis or critical anemias. Administered 40 PO and 20 IV mEq of potassium.   Serial troponins were not elevated.  EKG showed no evidence of STEMI or acute ischemia. COVID swab was negative.  X-ray showed no acute cardiopulmonary pathology.  CT head and neck showed no acute intracranial abnormality or traumatic misalignment. CT angio showed cardiomegaly and small pericardial effusion. Scattered ground-glass opacities favored to represent pulmonary edema. No abdominal aortic aneurysm or dissection. ED team administered Tylenol, morphine and nitroglycerin for chest pain. HEART Score: 5   Patient was admitted to CDU for further cardiac risk stratification.     Upon admission of the Clinical Decision Unit, patient is nontoxic-appearing and in no acute distress. No active vomiting or retching. He continues to endorse pain at the center of his chest but reports the nitroglycerin helped, currently rated  5/10. Patient is noted to be hypertensive in the 150s/100s which is consistent with medication non compliance for the past week. Denies any headache, dizziness, palpitations or shortness of breath.  He does not know the names of his home medications, attempted to call his son to ask, however there was no answer.     Limitations to history: None  Independent Historian: Patient  [facility]    The acute evaluation included:  Orders Placed This Encounter   Procedures    XR chest 2 views    Point of Care Ultrasound    CT head wo IV contrast    CT cervical spine wo IV contrast    CT angio chest abdomen pelvis    Troponin I Series, High Sensitivity (0, 1 HR)    CBC and Auto Differential    Comprehensive Metabolic Panel    Magnesium    Sars-CoV-2 PCR    Troponin I, High Sensitivity, Initial    Troponin, High Sensitivity, 1 Hour    Protime-INR    APTT    BLOOD GAS VENOUS FULL PANEL    BLOOD GAS VENOUS FULL PANEL    Vital Signs    Cardiac Monitoring - ED/ICU/PACU Only    ECG 12 lead    ECG 12 lead    ECG 12 lead    Insert and maintain peripheral IV       I reviewed the below labs and imaging as ordered by the ED provider:  CT angio chest abdomen pelvis         CT head wo IV contrast   Final Result   CT HEAD:   1. No acute intracranial abnormality or calvarial fracture.   2. Large area of encephalomalacia within the right MCA distribution,   consistent with history of prior stroke.             CT CERVICAL SPINE:   1. No acute fracture or traumatic malalignment of the cervical spine.   2. Multilevel degenerative changes of the cervical spine as described   above.        I personally reviewed the images/study and I agree with the findings   as stated by Cale Stevens MD. This study was interpreted at   [facility], [place], [place].        MACRO:   None.        Signed by: Evan Finkelstein 7/25/2024 3:54 AM   Dictation workstation:   [id number]      CT cervical spine  wo IV contrast   Final Result   CT HEAD:   1. No acute intracranial abnormality or calvarial fracture.   2. Large area of encephalomalacia within the right MCA distribution,   consistent with history of prior stroke.             CT CERVICAL SPINE:   1. No acute fracture or traumatic malalignment of the cervical spine.   2. Multilevel degenerative changes of the cervical spine as described   above.        I personally reviewed the images/study and I agree with the findings   as stated by Cale Stevens MD. This study was interpreted at   [facility], [place], [place].        MACRO:   None.        Signed by: Evan Finkelstein 7/25/2024 3:54 AM   Dictation workstation:   [id number]      XR chest 2 views   Final Result   No acute process.   Signed by Kwaku Magallanes MD      Point of Care Ultrasound    (Results Pending)       Labs Reviewed   CBC WITH AUTO DIFFERENTIAL - Abnormal       Result Value    WBC 6.8      nRBC 0.0      RBC 5.87      Hemoglobin 13.1 (*)     Hematocrit 40.5 (*)     MCV 69 (*)     MCH 22.3 (*)     MCHC 32.3      RDW 17.2 (*)     Platelets 218      Neutrophils % 56.0      Immature Granulocytes %, Automated 0.3      Lymphocytes % 30.7      Monocytes % 11.4      Eosinophils % 0.9      Basophils % 0.7      Neutrophils Absolute 3.79      Immature Granulocytes Absolute, Automated 0.02      Lymphocytes Absolute 2.08      Monocytes Absolute 0.77      Eosinophils Absolute 0.06      Basophils Absolute 0.05     COMPREHENSIVE METABOLIC PANEL - Abnormal    Glucose 110 (*)     Sodium 137      Potassium 2.9 (*)     Chloride 100      Bicarbonate 26      Anion Gap 14      Urea Nitrogen 14      Creatinine 0.78      eGFR >90      Calcium 8.6      Albumin 3.9      Alkaline Phosphatase 93      Total Protein 7.0      AST 17      Bilirubin, Total 0.9      ALT 12     PROTIME-INR - Abnormal    Protime 15.4 (*)     INR 1.4 (*)    BLOOD GAS VENOUS FULL PANEL - Abnormal    POCT pH, Venous 7.44 (*)      POCT pCO2, Venous 43      POCT pO2, Venous 52 (*)     POCT SO2, Venous 79 (*)     POCT Oxy Hemoglobin, Venous 77.2 (*)     POCT Hematocrit Calculated, Venous 39.0 (*)     POCT Sodium, Venous 136      POCT Potassium, Venous 2.6 (*)     POCT Chloride, Venous 100      POCT Ionized Calicum, Venous 1.17      POCT Glucose, Venous 92      POCT Lactate, Venous 1.1      POCT Base Excess, Venous 4.5 (*)     POCT HCO3 Calculated, Venous 29.2 (*)     POCT Hemoglobin, Venous 13.1 (*)     POCT Anion Gap, Venous 9.0 (*)     Patient Temperature 37.0      FiO2 21     MAGNESIUM - Normal    Magnesium 1.78     SARS-COV-2 PCR - Normal    Coronavirus 2019, PCR Not Detected      Narrative:     This assay has received FDA Emergency Use Authorization (EUA) and is only authorized for the duration of time that circumstances exist to justify the authorization of the emergency use of in vitro diagnostic tests for the detection of SARS-CoV-2 virus and/or diagnosis of COVID-19 infection under section 564(b)(1) of the Act, 21 U.S.C. 360bbb-3(b)(1). This assay is an in vitro diagnostic nucleic acid amplification test for the qualitative detection of SARS-CoV-2 from nasopharyngeal specimens and has been validated for use at [facility]. Negative results do not preclude COVID-19 infections and should not be used as the sole basis for diagnosis, treatment, or other management decisions.     SERIAL TROPONIN-INITIAL - Normal    Troponin I, High Sensitivity (CMC) 32      Narrative:     Less than 99th percentile of normal range cutoff-  Female and children under 18 years old <35 ng/L; Male <54 ng/L: Negative  Repeat testing should be performed if clinically indicated.     Female and children under 18 years old  ng/L; Male  ng/L:  Consistent with possible cardiac damage and possible increased clinical   risk. Serial measurements may help to assess extent of myocardial damage.     >120 ng/L: Consistent with cardiac  damage, increased clinical risk and  myocardial infarction. Serial measurements may help assess extent of   myocardial damage.      NOTE: Children less than 1 year old may have higher baseline troponin   levels and results should be interpreted in conjunction with the overall   clinical context.    NOTE: Troponin I testing is performed using a different   testing methodology at [facility] than at other   [facility]. Direct result comparisons should only   be made within the same method.     SERIAL TROPONIN, 1 HOUR - Normal    Troponin I, High Sensitivity (CMC) 35      Narrative:     Less than 99th percentile of normal range cutoff-  Female and children under 18 years old <35 ng/L; Male <54 ng/L: Negative  Repeat testing should be performed if clinically indicated.     Female and children under 18 years old  ng/L; Male  ng/L:  Consistent with possible cardiac damage and possible increased clinical   risk. Serial measurements may help to assess extent of myocardial damage.     >120 ng/L: Consistent with cardiac damage, increased clinical risk and  myocardial infarction. Serial measurements may help assess extent of   myocardial damage.      NOTE: Children less than 1 year old may have higher baseline troponin   levels and results should be interpreted in conjunction with the overall   clinical context.    NOTE: Troponin I testing is performed using a different   testing methodology at [facility] than at other   [facility]. Direct result comparisons should only   be made within the same method.     APTT - Normal    aPTT 32      Narrative:     The APTT is no longer used for monitoring Unfractionated Heparin Therapy. For monitoring Heparin Therapy, use the Heparin Assay.   TROPONIN SERIES- (INITIAL, 1 HR)    Narrative:     The following orders were created for panel order Troponin I Series, High Sensitivity (0, 1 HR).  Procedure                               Abnormality  "        Status                     ---------                               -----------         ------                     Troponin I, High Sensiti...[[id number]]  Normal              Final result               Troponin, High Sensitivi...[[id number]]  Normal              Final result                 Please view results for these tests on the individual orders.   BLOOD GAS VENOUS FULL PANEL         Past History     Past Medical History:   Diagnosis Date    Stroke (Multi)      History reviewed. No pertinent surgical history.  Social History     Socioeconomic History    Marital status:    Tobacco Use    Smoking status: Never    Smokeless tobacco: Never   Substance and Sexual Activity    Drug use: Never       Medications/Allergies     Previous Medications    No medications on file     No Known Allergies      Review of Systems  All systems reviewed and otherwise negative, except as stated above in HPI.      Physical Exam on Admission     Visit Vitals  BP (!) 151/106 (Patient Position: Lying)   Pulse 65   Temp 36.9 °C (98.5 °F) (Oral)   Resp 20   Ht 1.753 m (5' 9\")   Wt 91.2 kg (201 lb)   SpO2 96%   BMI 29.68 kg/m²   Smoking Status Never   BSA 2.11 m²       GENERAL: The patient appears nourished and normally developed. Vital signs as documented.     HEAD: Head normocephalic and atraumatic     NECK: Supple with full nonpainful ROM. No cervical lymphadenopathy or JVD    EYES: EOMs intact. PERRLA. Sclera non injected and without discharge    PULMONARY: Lungs sounds are diminished bilaterally, without any respiratory distress. No wheezes, rales or crackles. Able to speak full sentences, no accessory muscle use    CARDIAC: Normal rate. No murmurs, rubs or gallops    ABDOMEN: Soft, non-tender, BS positive x 4 quadrants, No rebound or guarding, no peritoneal signs    MUSCULOSKELETAL: Non pitting edema to bilateral lower extremities, with no obvious deformities. Pulses intact distal    SKIN: Good color, with no significant " rashes. No pallor, cyanosis or jaundice.    NEURO: A&Ox4. Able to follow commands.  Decreased sensation and strength on the left side    Psych: Appropriate mood and affect    Consultants  1) None      Impression and Plan  In summary, Nigel Chew is admitted to the [facility] Clinical Decision Unit for chest pain.     Dr. Krishna is the CDU admission attending.    This patient has been risk-stratified based on available history, physical exam, and related study findings. Admission to the observation status for further diagnosis/treatment/monitoring of chest pain  is warranted clinically. This extended period of observation is specifically required to determine the need for hospitalization.     The goals of this admission based on the patient’s clinical problem list are:  1) Chest pain       --Telemetry monitoring       --Continuous pulse ox       --Echocardiogram       --Medication requisition then administer home medications       --PRN analgesics       --PRN antiemetics        When met, appropriate disposition will be arranged.    Betzy Bishop PA-C  Emergency Medicine/Clinical Decision Unit  [facility]

## 2024-07-25 NOTE — H&P
Medicine History and Physical    Chief Complaint   Patient presents with    Chest Pain     Brought in by EMS from home for chest pain x 1 week, midsternal radiating down left arm. A&Ox4, verbal, follows directions. Hx CVA 1/2012 with paralysis to left side of body.        Subjective    History Of Present Illness  Nigel Chew is a 63 y.o. 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  Patient reports that he had experienced a 10/10 burning and nonradiating midsternal chest pain over the past week. Patient states that he has not taken his medications including those for heart failure over the past week after experiencing nausea and multiple episodes of nonbloody bilious vomiting.  Patient also reports 1 episode of syncope yesterday after he stood up from his chair while watching TV.  Denies hitting his head.  States that he lost consciousness when falling onto the floor.  Also had a similar event a few years ago.    [facility], patient has a history of HFrEF diagnosed in 12/2023.  LHC at the same time revealed no obstructive or nonobstructive CAD.  TTE with an EF of 25-30%, global ventricular hypokinesis, and moderate right atrial dilatation.  Patient had a follow-up appointment in the heart failure clinic at the [facility] in March 2024 and was found to be volume overloaded on exam.  At that time, patient was instructed to take additional Lasix for the next few days.    In the ED, patient was found to be hemodynamically stable and afebrile.  Blood pressure elevated in the 150s/100s.  CBC significant for hypokalemia 2.9, provided 40 mEq p.o. and 20 mEq IV potassium. EKG appears similar to previous exam within the [facility] records, without concerns for acute ischemia.  Troponins WNL.   CTA showing cardiomegaly and small pericardial effusion.  Also revealed scattered groundglass opacities favored to represent pulmonary edema.  TTE showing EF 21%, global hypokinesis, and moderate pericardial effusion without concerns for cardiac tamponade.  Patient was provided Tylenol, morphine, nitro for chest pain, which improved his midsternal chest pain to 5/10.    [facility]: Patient had a min HR of 37 bpm, max HR of 200 bpm, and avg HR of 72 bpm. Predominant underlying rhythm was Sinus Rhythm. First Degree AV Block was present. 158 Ventricular Tachycardia runs occurred, the run with the fastest interval lasting 5 beats with a max rate of 200 bpm, the longest lasting 11.4 secs with an avg rate of 139 bpm. 385 Supraventricular Tachycardia runs occurred, the run with the fastest interval lasting 19 beats with a max rate of 138 bpm (avg 116 bpm); the run with the fastest interval was also the longest. Isolated SVEs were frequent (5.9%, 97280), SVE Couplets were rare (<1.0%, 2475), and SVE Triplets were rare (<1.0%, 1519). Isolated VEs were frequent (7.9%, 21758), VE Couplets were occasional (1.5%, 9654), and VE Triplets were rare (<1.0%, 2788). Ventricular Bigeminy and Trigeminy were present.    Impressions: Predominant rhythm was sinus rhythm. First degree AV block was present. Non sustained VT. Non sustained SVT. Frequent, isolated PACs. Frequent, isolated PVCs (7.9%). Patient's triggered events correlated with sinus, PACs, PVCs      ED Triage Vitals [07/24/24 2221]   Temperature Heart Rate Respirations BP   36.9 °C (98.5 °F) 71 14 (!) 150/105      Pulse Ox Temp Source Heart Rate Source Patient Position   97 % Oral Monitor Sitting      BP Location FiO2 (%)     Right arm --          Medications   atorvastatin (Lipitor) tablet 80 mg (has no administration in time range)   empagliflozin (Jardiance) tablet 25 mg (25 mg oral Given 7/25/24 1019)   ondansetron (Zofran) injection 4 mg (4 mg  intravenous Given 7/25/24 1336)   spironolactone (Aldactone) tablet 12.5 mg (12.5 mg oral Given 7/25/24 0635)   carvedilol (Coreg) tablet 3.125 mg (3.125 mg oral Given 7/25/24 0636)   aspirin EC tablet 81 mg (81 mg oral Given 7/25/24 1330)   furosemide (Lasix) tablet 20 mg ( oral Dose Auto Held 7/29/24 1700)   potassium chloride CR (Klor-Con M20) ER tablet 20 mEq (20 mEq oral Given 7/25/24 1330)   sacubitriL-valsartan (Entresto) 24-26 mg per tablet 1 tablet (has no administration in time range)   tamsulosin (Flomax) 24 hr capsule 0.4 mg (has no administration in time range)   enoxaparin (Lovenox) syringe 40 mg (40 mg subcutaneous Given 7/25/24 1330)   sennosides-docusate sodium (Kerrie-Colace) 8.6-50 mg per tablet 2 tablet (has no administration in time range)   acetaminophen (Tylenol) tablet 975 mg (has no administration in time range)   lidocaine 4 % patch 1 patch (1 patch transdermal Medication Applied 7/25/24 1427)   oxyCODONE (Roxicodone) immediate release tablet 5 mg (5 mg oral Given 7/25/24 1429)   lactated Ringer's bolus 500 mL (500 mL intravenous New Bag 7/25/24 1427)   polyethylene glycol (Glycolax, Miralax) packet 17 g (has no administration in time range)   potassium chloride 20 mEq in sterile water for injection 100 mL (0 mEq intravenous Stopped 7/25/24 0414)   potassium chloride (Klor-Con) packet 40 mEq (40 mEq oral Given 7/25/24 0001)   magnesium sulfate 2 g in sterile water for injection 50 mL (0 g intravenous Stopped 7/25/24 0235)   iohexol (OMNIPaque) 350 mg iodine/mL solution 100 mL (100 mL intravenous Given 7/25/24 0155)   morphine injection 2 mg (2 mg intravenous Given 7/25/24 0429)   nitroglycerin (Nitrostat) SL tablet 0.4 mg (0.4 mg sublingual Given 7/25/24 0429)   acetaminophen (Tylenol) tablet 975 mg (975 mg oral Given 7/25/24 2390)   perflutren lipid microspheres (Definity) injection 0.5-10 mL of dilution (3 mL of dilution intravenous Given 7/25/24 3820)       ED Medication Administration  from 07/24/2024 2159 to 07/25/2024 0622         Date/Time Order Dose Route Action Action by     07/25/2024 0001 EDT potassium chloride (Klor-Con) packet 40 mEq 40 mEq oral Given Skerritt, Y     07/25/2024 0002 EDT magnesium sulfate 2 g in sterile water for injection 50 mL 2 g intravenous New Bag Skerritt, Y     07/25/2024 0008 EDT potassium chloride 20 mEq in sterile water for injection 100 mL 20 mEq intravenous New Bag Skerritt, Y     07/25/2024 0155 EDT iohexol (OMNIPaque) 350 mg iodine/mL solution 100 mL 100 mL intravenous Given [facility], B     07/25/2024 0210 EDT potassium chloride 20 mEq in sterile water for injection 100 mL 0 mEq intravenous Stopped Skerritt, Y     07/25/2024 0214 EDT potassium chloride 20 mEq in sterile water for injection 100 mL 20 mEq intravenous New Bag Skerritt, Y     07/25/2024 0235 EDT magnesium sulfate 2 g in sterile water for injection 50 mL 0 g intravenous Stopped Skerritt, Y     07/25/2024 0414 EDT potassium chloride 20 mEq in sterile water for injection 100 mL 0 mEq intravenous Stopped Skerritt, Y     07/25/2024 0427 EDT acetaminophen (Tylenol) tablet 975 mg 975 mg oral Given Skerritt, Y     07/25/2024 0429 EDT morphine injection 2 mg 2 mg intravenous Given Skerritt, Y     07/25/2024 0429 EDT nitroglycerin (Nitrostat) SL tablet 0.4 mg 0.4 mg sublingual Given Skerritt, Y     07/25/2024 0440 EDT sacubitriL-valsartan (Entresto) 24-26 mg per tablet 1 tablet 1 tablet oral Not Given NAZARIO العراقي     07/25/2024 0450 EDT nitroglycerin (Nitrostat) SL tablet 0.4 mg 0.4 mg sublingual Given Skerritt, Y     07/25/2024 0504 EDT nitroglycerin (Nitrostat) SL tablet 0.4 mg 0.4 mg sublingual Given Skerritt, Y             Review of Systems   Constitutional:  Negative for activity change, chills, fever and unexpected weight change.   HENT:  Negative for sneezing and sore throat.    Eyes:  Negative for visual disturbance.   Respiratory:  Negative for cough, chest tightness and shortness of breath.     Cardiovascular:  Positive for chest pain.   Gastrointestinal:  Positive for nausea and vomiting. Negative for abdominal pain, blood in stool, constipation and diarrhea.   Endocrine: Negative for polydipsia and polyuria.   Genitourinary:  Negative for dysuria, frequency and urgency.   Neurological:  Negative for dizziness, light-headedness and headaches.       Past Medical History  He has a past medical history of CHF (congestive heart failure) (Multi) and Stroke (Multi).    Surgical History  He has no past surgical history on file.      Social History  He reports that he has never smoked. He has never used smokeless tobacco. He reports current alcohol use of about 1.0 standard drink of alcohol per week. He reports that he does not use drugs.    Family History  Family History   Problem Relation Name Age of Onset    Heart attack Mother  60 - 69    Heart attack Father  60 - 69        Allergies  Patient has no known allergies.    Objective    Prior to Admission medications    Medication Sig Start Date End Date Taking? Authorizing Provider   acetaminophen-codeine (Tylenol w/ Codeine #3) 300-30 mg tablet Take 3 tablets by mouth every morning as needed and 2 tablets by mouth every evening as needed with food (take no more than 4000mg of acetaminophen per 24 hours)    Historical Provider, MD   aspirin 81 mg EC tablet Take 1 tablet (81 mg) by mouth once daily.    Historical Provider, MD   atorvastatin (Lipitor) 80 mg tablet Take 1 tablet (80 mg) by mouth once daily.    Historical Provider, MD   carvedilol (Coreg) 3.125 mg tablet Take 1 tablet (3.125 mg) by mouth 2 times a day.    Historical Provider, MD   cholecalciferol (Vitamin D-3) 50 MCG (2000 UT) tablet Take 1 tablet (50 mcg) by mouth once daily.    Historical Provider, MD   empagliflozin (Jardiance) 25 mg Take 1 tablet (25 mg) by mouth once daily.    Historical Provider, MD   furosemide (Lasix) 20 mg tablet Take 1 tablet (20 mg) by mouth 2 times daily (morning and  late afternoon).    Historical Provider, MD   magnesium oxide (Mag-Ox) 420 mg tablet Take 1 tablet (420 mg) by mouth once daily.    Historical Provider, MD   naproxen (Naprosyn) 500 mg tablet Take 1 tablet (500 mg) by mouth 2 times a day as needed (pain).    Historical Provider, MD   potassium chloride CR 20 mEq ER tablet Take 1 tablet (20 mEq) by mouth once daily. Do not crush or chew.    Historical Provider, MD   sacubitriL-valsartan (Entresto) 24-26 mg tablet Take 1 tablet by mouth 2 times a day.    Historical Provider, MD   spironolactone (Aldactone) 25 mg tablet Take 0.5 tablets (12.5 mg) by mouth once daily.    Historical Provider, MD   tamsulosin (Flomax) 0.4 mg 24 hr capsule Take 1 capsule (0.4 mg) by mouth once daily at bedtime.    Historical Provider, MD   fluticasone (Flonase) 50 mcg/actuation nasal spray Administer 1 spray into each nostril once daily as needed for rhinitis or allergies. Shake gently. Before first use, prime pump. After use, clean tip and replace cap.  7/25/24  Historical Provider, MD        Last Recorded Vitals:  Vitals:    07/25/24 1436   BP:    Pulse: 74   Resp:    Temp:    SpO2:          Physical Exam:  Physical Exam  Constitutional:       General: He is not in acute distress.     Appearance: Normal appearance. He is not ill-appearing.   HENT:      Mouth/Throat:      Mouth: Mucous membranes are moist.      Pharynx: Oropharynx is clear.   Eyes:      Conjunctiva/sclera: Conjunctivae normal.      Pupils: Pupils are equal, round, and reactive to light.   Neck:      Vascular: No hepatojugular reflux or JVD.   Cardiovascular:      Rate and Rhythm: Normal rate and regular rhythm.      Pulses: Normal pulses.      Heart sounds: Normal heart sounds, S1 normal and S2 normal. No murmur heard.     No friction rub. No gallop.   Pulmonary:      Effort: Pulmonary effort is normal. No respiratory distress.      Breath sounds: Decreased air movement present. No wheezing.   Chest:      Chest wall:  Tenderness (chest pain upon palpation of the sternum) present.   Abdominal:      General: Abdomen is flat. There is no distension.      Palpations: Abdomen is soft. There is no mass.      Tenderness: There is no abdominal tenderness. There is no guarding or rebound.   Musculoskeletal:         General: No swelling or deformity. Normal range of motion.      Right lower leg: No edema.      Left lower leg: No edema.   Skin:     General: Skin is warm and dry.   Neurological:      General: No focal deficit present.      Mental Status: He is alert and oriented to person, place, and time. Mental status is at baseline.      Cranial Nerves: Cranial nerves 2-12 are intact.      Sensory: Sensation is intact.      Comments: Chronic absent motor function and decreased sensation in the left extremities.   Psychiatric:         Mood and Affect: Mood normal.         Behavior: Behavior normal.        Labs  Results from last 7 days   Lab Units 07/24/24  2224   WBC AUTO x10*3/uL 6.8   HEMOGLOBIN g/dL 13.1*   HEMATOCRIT % 40.5*   PLATELETS AUTO x10*3/uL 218            Results from last 7 days   Lab Units 07/25/24  0550 07/24/24  2224   SODIUM mmol/L  --  137   POTASSIUM mmol/L 3.5 2.9*   CHLORIDE mmol/L  --  100   CO2 mmol/L  --  26   BUN mg/dL  --  14   CREATININE mg/dL  --  0.78   CALCIUM mg/dL  --  8.6     Results from last 7 days   Lab Units 07/24/24  2224   ALK PHOS U/L 93   BILIRUBIN TOTAL mg/dL 0.9   PROTEIN TOTAL g/dL 7.0   ALT U/L 12   AST U/L 17      Results from last 7 days   Lab Units 07/24/24  2226   APTT seconds 32   INR  1.4*      Transthoracic Echo (TTE) Complete   Final Result      CT angio chest abdomen pelvis   Final Result   No thoracic or abdominal aortic aneurysm or acute aortic pathology.        Enlarged main pulmonary artery measuring up to 3.2 cm. Findings may   be seen with pulmonary arterial hypertension.        Cardiomegaly and small pericardial effusion.        Thickened interlobular septal markings and  scattered ground-glass   opacities favored to represent pulmonary edema. Superimposed   infectious or inflammatory processes are not excluded.        Partially enhancing hepatic lesions which may represent hemangiomas,   however, are incompletely characterize. Recommend nonemergent MRI to   further characterize.        Age indeterminate compression deformity of the L5 vertebral body with   irregularity of the inferior endplate. Correlate with point   tenderness.        Additional findings as described above.        I personally reviewed the images/study and I agree with the findings   as stated by Cale Stevens MD. This study was interpreted at   [facility], [place], [place].        MACRO:   Critical Finding:  See findings. Notification was initiated on   7/25/2024 at 3:53 am by  Cale Stevens.  (**-OCF-**) Instructions:        Signed by: Evan Finkelstein 7/25/2024 4:32 AM   Dictation workstation:   [id number]      CT head wo IV contrast   Final Result   CT HEAD:   1. No acute intracranial abnormality or calvarial fracture.   2. Large area of encephalomalacia within the right MCA distribution,   consistent with history of prior stroke.             CT CERVICAL SPINE:   1. No acute fracture or traumatic malalignment of the cervical spine.   2. Multilevel degenerative changes of the cervical spine as described   above.        I personally reviewed the images/study and I agree with the findings   as stated by Cale Stveens MD. This study was interpreted at   [facility], [place], [place].        MACRO:   None.        Signed by: Evan Finkelstein 7/25/2024 3:54 AM   Dictation workstation:   [id number]      CT cervical spine wo IV contrast   Final Result   CT HEAD:   1. No acute intracranial abnormality or calvarial fracture.   2. Large area of encephalomalacia within the right MCA distribution,   consistent with history of prior stroke.             CT  CERVICAL SPINE:   1. No acute fracture or traumatic malalignment of the cervical spine.   2. Multilevel degenerative changes of the cervical spine as described   above.        I personally reviewed the images/study and I agree with the findings   as stated by Cale Stevens MD. This study was interpreted at   [facility], [place], [place].        MACRO:   None.        Signed by: Evan Finkelstein 7/25/2024 3:54 AM   Dictation workstation:   [id number]      XR chest 2 views   Final Result   No acute process.   Signed by Kwaku Magallanes MD             Assessment/Plan   Nigel Chew is a 63 y.o. 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 Patient had a 1-week history of substernal chest pain with associated nausea/vomiting and one episode of syncope. Patient reports not eating and drinking well over the past week and has not been taking home medications for that same time. Presentation and workup non-concerning for ACS with negative troponin and EKG without acute ischemic changes. Physical exam significant for pain upon palpation of the sternum without signs of heart failure exacerbation such as lower extremity edema, weight gain, and JVD. . CTA showing cardiomegaly and small pericardial effusion. TTE showing EF 21%, global hypokinesis, and moderate pericardial effusion without concerns for cardiac tamponade. Concerns for dehydration at this time given positive orthostatics.    #Chest pain likely secondary to costochondritis vs. GERD  -No concerns for ACS at this time Troponins in the ED WNL.  -EKG without signs of acute ischemia, [facility].  -S/p nitroglycerin x 2 in the ED with improvement in pain from 10/10 to  5/10.  Plan:  NPO at MN for possible stress test in the AM.  Will continue to monitor for s/s of GERD.  Pain control with lidocaine patch, Tylenol 975 mg TID, oxycodone 5 mg q6h PRN.    #Heart Failure with reduced ejection fraction (HFrEF), EF 21%  #NICM  -NYHA class II-III  -Does not appear to be volume overloaded on physical exam  -TTE 12/2023 @ [facility]: EF 25-30%, now 21% on TTE 7/25/2024. TTE showing EF 21%, global hypokinesis, and moderate pericardial effusion without concerns for cardiac tamponade.  -Home med regimen: Coreg 6.25 mg twice daily, Jardiance 25 daily, Entresto 50 mg twice daily, Aldactone 12.5 mg daily, Lasix 40 mg twice daily  -LHC at [facility] in 12/2023 showing no signs of obstructive or nonobstructive CAD.  -BNP: 137  Plan:  Admission weight 201 lbs, dry weight 205 lbs.  Strict I/O, daily standing weights, Na-restricted diet.  Restart home med regimen: Coreg 6.25 twice daily, Jardiance 25 daily, Entresto 50 mg twice daily, Aldactone 12.5 mg daily  Holding home Lasix 40 mg BID  Continue home atorvastatin 80 mg    #Syncope likely secondary to orthostatic hypotension  -Patient appears dehydrated on exam, consistent with nausea and vomiting for the past week  -Orthostatics positive  Plan:  Ordered  mL bolus. Will reassess volume status in AM.    #Nausea w/o active vomiting  -Possibly secondary to dehydration and malnutrition over the past week.  Plan:  Zofran PRN    # Hypokalemia likely secondary to dehydration and malnutrition for 1 week  -Significant hypokalemia on presentation (2.9) and repeat 3.5 this a.m.  -S/p 40 mEq p.o. and 20 mEq IV in the ED  Plan:  Replete as needed    #Partially enhancing hepatic lesion likely hemangiomas on CT A/P  -Incompletely characterized on CT A/P w/o contrast 7/25.  -Recommend nonemergent MRI to further characterize.    #Prior CVA (2012) with residual left-sided weakness    #BPH  -Continue home Flomax 0.4 mg daily    IVF: PRN  Electrolytes:  K>4, Mg>2  Access: PIV    Diet: Adult diet Regular, Cardiac; 70 gm fat; 2 - 3 grams Sodium; 1500 mL fluid  NPO Diet; Effective midnight  NPO Diet; Effective midnight, NPO at MN  DVT Ppx: Lovenox  GI Ppx: Not indicated  Bowel regimen: Miralax daily, Kerrie-colace PRN  Pain control: lidocaine patch, Tylenol 975 mg TID, oxycodone 5 mg q6h PRN    Emergency Contact: Extended Emergency Contact Information  Primary Emergency Contact: DONOVAN HINDS  Mobile Phone: [phone]  Relation: Daughter  Preferred language: English   needed? No  Secondary Emergency Contact: Roel Hinds  Home Phone: [phone]  Relation: Child   Code status: Full Code (confirmed on admission)  PT/OT: Ordered evaluation  Disposition: Home     Chris Gilbert MD   PGY-1 Internal Medicine

## 2024-07-25 NOTE — ED PROVIDER NOTES
Emergency Department Provider Note        History of Present Illness     History provided by: Patient  Limitations to History: None  External Records Reviewed with Brief Summary: [facility]    HPI:  Nigel Chew is a 63 y.o. male with history of HFrEF (EF 25-30% in 2023), nonischemic cardiomyopathy, hypertension, hyperlipidemia, and prior CVA () with residual left-sided weakness who initially presented to the emergency department with chest pain and nausea vomiting.  Noted that he had central burning chest pain, has not radiated anywhere.  Notes he was at rest 1 week ago at this time, this got progressively worse, then noted few days ago started having nausea vomiting.  Denies any hematochezia or melena.  Declines any fevers or abdominal pain.    Physical Exam   Triage vitals:  T 36.9 °C (98.5 °F)  HR 71  BP (!) 150/105  RR 14  O2 97 % None (Room air)    GEN:  A&Ox3, no acute distress, appears comfortable. Conversational and appropriate.    HEENT: Normocephalic, atraumatic. Conjunctiva pink with no redness or exudates. Hearing grossly intact. Moist mucous membranes.  CARDIO: Normal rate and regular rhythm. Normal S1, S2  without murmurs, rubs, or gallops.   PULM: Clear to auscultation bilaterally. No rales, rhonchi, or wheezes. No accessory muscle use or stridor.  GI: Soft, non-tender, non-distended. No rebound tenderness or guarding.   SKIN: Warm and dry, no rashes, lesions, petechiae, or purpura.  MSK: ROM intact in all 4 extremities without contractures or pain. No peripheral edema, contusions, or wounds.    NEURO: No focal findings identified. No confusion or gross mental status changes.  PSYCH: Appropriate mood and behavior, converses and responds appropriately during exam.    Medical Decision Making & ED Course   Medical Decision Makin y.o. male history of HFrEF (EF 25-30% in 2023), nonischemic cardiomyopathy, hypertension, hyperlipidemia, and prior CVA () with  residual left-sided weakness who initially presented to the emergency department with chest pain and nausea vomiting, was found to have hypokalemia, with stable troponins, in addition to an EKG without any acute acute ischemic findings.  His previous EF was roughly 6 months ago.  Current heart score 5, would benefit from admission for cardiac restratification.  Discussed with CDU provider, agreeable to this.  Admitted to CDU    ----  Scoring Tools Utilized:  heart score 5        Differential diagnoses considered include but are not limited to: Chest pain, PE, acute on chronic heart failure, NSTEMI     Social Determinants of Health which Significantly Impact Care: None identified     EKG Independent Interpretation: EKG interpreted by myself. Please see ED Course for full interpretation.    Independent Result Review and Interpretation: Relevant laboratory and radiographic results were reviewed and independently interpreted by myself.  As necessary, they are commented on in the ED Course.    Chronic conditions affecting the patient's care: As documented above in MDM    The patient was discussed with the following consultants/services:  CDU provider    Care Considerations: As documented above in MDM    ED Course:  ED Course as of 07/28/24 0836   Wed Jul 24, 2024   2343 POTASSIUM(!!): 2.9 [AD]   2343 HEMOGLOBIN(!): 13.1 [AD]   2343 Troponin I, High Sensitivity (CMC): 32 [AD]   2343 XR chest 2 views  No acute findings [AD]   2343 ECG 12 lead  EKG showing [AD]   u Jul 25, 2024   0257 ECG 12 lead  Interpreted by me.  7/25/2024 at 0239.  Sinus rhythm sinus arrhythmia first-degree AV block and left bundle branch block.  66 bpm.    QTc 469.  No ST elevation. [MC]      ED Course User Index  [AD] Cheryl Herrera DO  [MC] Donato Krishna MD         Diagnoses as of 07/28/24 0836   Chest pain, unspecified type     Disposition   As a result of their workup, the patient will require admission to the hospital.  The  patient was informed of his diagnosis.  The patient was given the opportunity to ask questions and I answered them. The patient agreed to be admitted to the hospital.    Procedures   Procedures    Patient seen and discussed with ED attending physician.    Cheryl Ardon DO  Emergency Medicine       Cheryl Ardon DO  Resident  07/28/24 0827

## 2024-07-25 NOTE — PROGRESS NOTES
CDU   Disposition Note    Date of Placement in CDU: 7/25  Time of Disposition: 1155    Subjective  Mr. Chew has undergone comprehensive diagnostic evaluation and therapeutic management in accordance with the CDU guidelines for syncopal episode. Based on his clinical response and diagnostic information during this period of observation, it has been determined that the patient will be admitted to the hospital.    ED/CDU course:  Nigel Chew is a 63 y.o. male with a past medical history significant for hypertension, hyperlipidemia, prior CVA 2012 with residual left sided weakness who presents to the emergency department complaining of progressively worsening chest pain.  Patient states that for the past week he has been experiencing non radiating, burning pain at the center of his chest, rated 10/10. He has not taken any of his home mediations for the past week as he was afraid he might throw them up.   Laboratory work with potassium 2.9 repleted with 60 mill equivalents, nonischemic EKG, negative troponin. CT angio showed cardiomegaly and small pericardial effusion. Scattered ground-glass opacities favored to represent pulmonary edema. No abdominal aortic aneurysm or dissection. ED team administered Tylenol, morphine and nitroglycerin for chest pain. HEART Score: 5   Patient was admitted to CDU for further cardiac risk stratification.      Upon admission of the Clinical Decision Unit, patient is nontoxic-appearing and in no acute distress. No active vomiting or retching. He continues to endorse pain at the center of his chest but reports the nitroglycerin helped, currently rated 5/10. Patient was noted to be hypertensive in the 150s/100s which is consistent with medication non compliance for the past week. Denies any headache, dizziness, palpitations or shortness of breath.  He does not know the names of his home medications, attempted to call his son to ask, however there was no answer.     He underwent  echocardiogram while in CDU.  He does report improvement in his symptoms.  Tolerating p.o. well without difficulty.  Echocardiogram returned positive with severely decreased EF, global hypokinesis, dilation, moderate pericardial effusion.  The results were discussed with the patient.  He will be admitted to the medicine under cardiology service for further evaluation.    Physical Exam:   GENERAL.: Vitals noted. No distress.  Normocephalic atraumatic.   EYES:  PERRLA, EOMs intact  OROPHARYNX:  No erythema or exudate.  Mucosa moist.  NECK: Supple. No adenopathy.  CARDIAC: Regular rate rhythm. No murmur noted.  PULMONARY: Equal breath sounds bilaterally.  No wheezes rales or rhonchi  ABDOMEN: Soft, nontender, nonsurgical. No guarding. Normoactive bowel sounds. No bruits, no masses  EXTREMITIES: Full ROM, no pitting edema,   SKIN: Intact, warm and dry  NEURO: Alert and oriented x 3, speech is clear, no obvious deficits noted.       Diagnostic Evaluation  Diagnostic studies germane to this period of clinical observation include:   1) positive echocardiogram    Impression and Plan  Mr. Chew has been cared for according to the standard [facility] Clinical Decision Unit observation protocol for syncope. This extended period of observation was specifically required to determine the need for hospitalization. Prior to discharge from observation, the final physical exam is documented above.     Significant events during the course of observation based on the goals of the clinical problem list include:   1) syncope  - Unremarkable ED evaluation  - Positive echocardiogram in CDU  - Admitted to cardiology service  -Improved symptomatology    Based on the patient's condition and test results, the patient will be admitted to cardiology service    Total length of observation was 7.5 hours. Dr. Arellano is the CDU disposition attending.    As appropriate, please see the Exit Care module for comprehensive  discharge instructions.

## 2024-07-25 NOTE — PROGRESS NOTES
Pharmacy Medication History Review    Nigel Chew is a 63 y.o. male admitted for Chest pain. Pharmacy reviewed the patient's [id number] medications and allergies for accuracy.    The list below reflects the updated PTA list. Comments regarding how patient may be taking medications differently can be found in the Admit Orders Activity  Prior to Admission Medications   Prescriptions Last Dose Informant   acetaminophen-codeine (Tylenol w/ Codeine #3) 300-30 mg tablet  Self   Sig: Take 3 tablets by mouth every morning as needed and 2 tablets by mouth every evening as needed with food (take no more than 4000mg of acetaminophen per 24 hours)   aspirin 81 mg EC tablet  Self   Sig: Take 1 tablet (81 mg) by mouth once daily.   atorvastatin (Lipitor) 80 mg tablet  Self   Sig: Take 1 tablet (80 mg) by mouth once daily.   carvedilol (Coreg) 3.125 mg tablet  Self   Sig: Take 1 tablet (3.125 mg) by mouth 2 times a day.   cholecalciferol (Vitamin D-3) 50 MCG (2000 UT) tablet  Self   Sig: Take 1 tablet (50 mcg) by mouth once daily.   empagliflozin (Jardiance) 25 mg  Self   Sig: Take 1 tablet (25 mg) by mouth once daily.   furosemide (Lasix) 20 mg tablet  Self   Sig: Take 1 tablet (20 mg) by mouth 2 times daily (morning and late afternoon).   magnesium oxide (Mag-Ox) 420 mg tablet  Self   Sig: Take 1 tablet (420 mg) by mouth once daily.   naproxen (Naprosyn) 500 mg tablet  Self   Sig: Take 1 tablet (500 mg) by mouth 2 times a day as needed (pain).   potassium chloride CR 20 mEq ER tablet  Self   Sig: Take 1 tablet (20 mEq) by mouth once daily. Do not crush or chew.   sacubitriL-valsartan (Entresto) 24-26 mg tablet  Self   Sig: Take 1 tablet by mouth 2 times a day.   spironolactone (Aldactone) 25 mg tablet  Self   Sig: Take 0.5 tablets (12.5 mg) by mouth once daily.   tamsulosin (Flomax) 0.4 mg 24 hr capsule  Self   Sig: Take 1 capsule (0.4 mg) by mouth once daily at bedtime.      Facility-Administered Medications: None         The list below reflects the updated allergy list. Please review each documented allergy for additional clarification and justification.  Allergies  Reviewed by Lamar Titus RN on 7/24/2024   No Known Allergies         Patient declines M2B at discharge. [facility].    Sources:    -patient interview (could confirm meds/how many times a day when brand and generic name read off to him and what each med is used for told to him)  -outpatient pharmacy dispense history (called [facility] to [facility] with pharmacist active medications filled recently)  -Care Everywhere medication lists- [facility] medication list  -OARRS    Below are additional concerns with the patient's PTA list:     All listed meds are active in [facility] and filled in the 2 months for 90 day supplies (except for aspirin last filled Feb 2024 x 4 month supply)    Mor Nascimento, PharmD  Transitions of Care Pharmacist  [facility]  Please reach out via Secure Chat for questions, or if no response call [employer] or [facility]

## 2024-07-26 ENCOUNTER — APPOINTMENT (OUTPATIENT)
Dept: CARDIOLOGY | Facility: HOSPITAL | Age: 64
End: 2024-07-26
Payer: COMMERCIAL

## 2024-07-26 ENCOUNTER — APPOINTMENT (OUTPATIENT)
Dept: RADIOLOGY | Facility: HOSPITAL | Age: 64
End: 2024-07-26
Payer: COMMERCIAL

## 2024-07-26 VITALS
HEIGHT: 69 IN | TEMPERATURE: 98.6 F | BODY MASS INDEX: 30.66 KG/M2 | WEIGHT: 207.01 LBS | HEART RATE: 57 BPM | SYSTOLIC BLOOD PRESSURE: 140 MMHG | DIASTOLIC BLOOD PRESSURE: 80 MMHG | RESPIRATION RATE: 18 BRPM | OXYGEN SATURATION: 95 %

## 2024-07-26 LAB
ALBUMIN SERPL BCP-MCNC: 3.3 G/DL (ref 3.4–5)
ALP SERPL-CCNC: 81 U/L (ref 33–136)
ALT SERPL W P-5'-P-CCNC: 8 U/L (ref 10–52)
ANION GAP SERPL CALC-SCNC: 14 MMOL/L (ref 10–20)
AST SERPL W P-5'-P-CCNC: 13 U/L (ref 9–39)
BASOPHILS # BLD AUTO: 0.04 X10*3/UL (ref 0–0.1)
BASOPHILS NFR BLD AUTO: 0.7 %
BILIRUB SERPL-MCNC: 0.7 MG/DL (ref 0–1.2)
BUN SERPL-MCNC: 13 MG/DL (ref 6–23)
CALCIUM SERPL-MCNC: 8.4 MG/DL (ref 8.6–10.6)
CHLORIDE SERPL-SCNC: 106 MMOL/L (ref 98–107)
CO2 SERPL-SCNC: 25 MMOL/L (ref 21–32)
CREAT SERPL-MCNC: 0.79 MG/DL (ref 0.5–1.3)
EGFRCR SERPLBLD CKD-EPI 2021: >90 ML/MIN/1.73M*2
EOSINOPHIL # BLD AUTO: 0.13 X10*3/UL (ref 0–0.7)
EOSINOPHIL NFR BLD AUTO: 2.4 %
ERYTHROCYTE [DISTWIDTH] IN BLOOD BY AUTOMATED COUNT: 17.2 % (ref 11.5–14.5)
GLUCOSE SERPL-MCNC: 101 MG/DL (ref 74–99)
HCT VFR BLD AUTO: 40 % (ref 41–52)
HGB BLD-MCNC: 12.2 G/DL (ref 13.5–17.5)
IMM GRANULOCYTES # BLD AUTO: 0.02 X10*3/UL (ref 0–0.7)
IMM GRANULOCYTES NFR BLD AUTO: 0.4 % (ref 0–0.9)
LYMPHOCYTES # BLD AUTO: 1.49 X10*3/UL (ref 1.2–4.8)
LYMPHOCYTES NFR BLD AUTO: 27.6 %
MAGNESIUM SERPL-MCNC: 1.96 MG/DL (ref 1.6–2.4)
MCH RBC QN AUTO: 21.9 PG (ref 26–34)
MCHC RBC AUTO-ENTMCNC: 30.5 G/DL (ref 32–36)
MCV RBC AUTO: 72 FL (ref 80–100)
MONOCYTES # BLD AUTO: 0.59 X10*3/UL (ref 0.1–1)
MONOCYTES NFR BLD AUTO: 10.9 %
NEUTROPHILS # BLD AUTO: 3.13 X10*3/UL (ref 1.2–7.7)
NEUTROPHILS NFR BLD AUTO: 58 %
NRBC BLD-RTO: 0 /100 WBCS (ref 0–0)
PLATELET # BLD AUTO: 195 X10*3/UL (ref 150–450)
POTASSIUM SERPL-SCNC: 3.5 MMOL/L (ref 3.5–5.3)
PROT SERPL-MCNC: 6.2 G/DL (ref 6.4–8.2)
RBC # BLD AUTO: 5.56 X10*6/UL (ref 4.5–5.9)
SODIUM SERPL-SCNC: 141 MMOL/L (ref 136–145)
WBC # BLD AUTO: 5.4 X10*3/UL (ref 4.4–11.3)

## 2024-07-26 PROCEDURE — 93010 ELECTROCARDIOGRAM REPORT: CPT | Performed by: INTERNAL MEDICINE

## 2024-07-26 PROCEDURE — 75563 CARD MRI W/STRESS IMG & DYE: CPT

## 2024-07-26 PROCEDURE — 2500000004 HC RX 250 GENERAL PHARMACY W/ HCPCS (ALT 636 FOR OP/ED)

## 2024-07-26 PROCEDURE — 2500000001 HC RX 250 WO HCPCS SELF ADMINISTERED DRUGS (ALT 637 FOR MEDICARE OP)

## 2024-07-26 PROCEDURE — G0378 HOSPITAL OBSERVATION PER HR: HCPCS

## 2024-07-26 PROCEDURE — 2550000001 HC RX 255 CONTRASTS

## 2024-07-26 PROCEDURE — 36415 COLL VENOUS BLD VENIPUNCTURE: CPT

## 2024-07-26 PROCEDURE — 80053 COMPREHEN METABOLIC PANEL: CPT

## 2024-07-26 PROCEDURE — 83735 ASSAY OF MAGNESIUM: CPT

## 2024-07-26 PROCEDURE — 85025 COMPLETE CBC W/AUTO DIFF WBC: CPT

## 2024-07-26 PROCEDURE — 96376 TX/PRO/DX INJ SAME DRUG ADON: CPT | Mod: 59

## 2024-07-26 PROCEDURE — 2500000002 HC RX 250 W HCPCS SELF ADMINISTERED DRUGS (ALT 637 FOR MEDICARE OP, ALT 636 FOR OP/ED)

## 2024-07-26 PROCEDURE — A9575 INJ GADOTERATE MEGLUMI 0.1ML: HCPCS

## 2024-07-26 PROCEDURE — 93005 ELECTROCARDIOGRAM TRACING: CPT

## 2024-07-26 PROCEDURE — 75563 CARD MRI W/STRESS IMG & DYE: CPT | Performed by: RADIOLOGY

## 2024-07-26 PROCEDURE — 99223 1ST HOSP IP/OBS HIGH 75: CPT

## 2024-07-26 PROCEDURE — 96372 THER/PROPH/DIAG INJ SC/IM: CPT

## 2024-07-26 PROCEDURE — 2500000005 HC RX 250 GENERAL PHARMACY W/O HCPCS

## 2024-07-26 RX ORDER — POTASSIUM CHLORIDE 20 MEQ/1
20 TABLET, EXTENDED RELEASE ORAL DAILY
Start: 2024-07-26

## 2024-07-26 RX ORDER — POTASSIUM CHLORIDE 20 MEQ/1
20 TABLET, EXTENDED RELEASE ORAL ONCE
Status: COMPLETED | OUTPATIENT
Start: 2024-07-26 | End: 2024-07-26

## 2024-07-26 RX ORDER — AMINOPHYLLINE 25 MG/ML
100 INJECTION, SOLUTION INTRAVENOUS ONCE
OUTPATIENT
Start: 2024-07-26 | End: 2024-07-26

## 2024-07-26 RX ORDER — REGADENOSON 0.08 MG/ML
0.4 INJECTION, SOLUTION INTRAVENOUS
OUTPATIENT
Start: 2024-07-26

## 2024-07-26 RX ORDER — FUROSEMIDE 20 MG/1
20 TABLET ORAL
Start: 2024-07-26

## 2024-07-26 RX ORDER — GADOTERATE MEGLUMINE 376.9 MG/ML
37 INJECTION INTRAVENOUS
Status: COMPLETED | OUTPATIENT
Start: 2024-07-26 | End: 2024-07-26

## 2024-07-26 SDOH — ECONOMIC STABILITY: INCOME INSECURITY: IN THE LAST 12 MONTHS, WAS THERE A TIME WHEN YOU WERE NOT ABLE TO PAY THE MORTGAGE OR RENT ON TIME?: NO

## 2024-07-26 SDOH — ECONOMIC STABILITY: HOUSING INSECURITY: AT ANY TIME IN THE PAST 12 MONTHS, WERE YOU HOMELESS OR LIVING IN A SHELTER (INCLUDING NOW)?: NO

## 2024-07-26 SDOH — ECONOMIC STABILITY: HOUSING INSECURITY: IN THE PAST 12 MONTHS, HOW MANY TIMES HAVE YOU MOVED WHERE YOU WERE LIVING?: 1

## 2024-07-26 SDOH — SOCIAL STABILITY: SOCIAL INSECURITY: HAVE YOU HAD THOUGHTS OF HARMING ANYONE ELSE?: NO

## 2024-07-26 SDOH — ECONOMIC STABILITY: TRANSPORTATION INSECURITY
IN THE PAST 12 MONTHS, HAS LACK OF TRANSPORTATION KEPT YOU FROM MEETINGS, WORK, OR FROM GETTING THINGS NEEDED FOR DAILY LIVING?: NO

## 2024-07-26 SDOH — ECONOMIC STABILITY: INCOME INSECURITY: HOW HARD IS IT FOR YOU TO PAY FOR THE VERY BASICS LIKE FOOD, HOUSING, MEDICAL CARE, AND HEATING?: NOT VERY HARD

## 2024-07-26 SDOH — ECONOMIC STABILITY: TRANSPORTATION INSECURITY
IN THE PAST 12 MONTHS, HAS THE LACK OF TRANSPORTATION KEPT YOU FROM MEDICAL APPOINTMENTS OR FROM GETTING MEDICATIONS?: NO

## 2024-07-26 ASSESSMENT — LIFESTYLE VARIABLES
HOW OFTEN DO YOU HAVE 6 OR MORE DRINKS ON ONE OCCASION: NEVER
HOW OFTEN DO YOU HAVE A DRINK CONTAINING ALCOHOL: NEVER
AUDIT-C TOTAL SCORE: 0
HOW MANY STANDARD DRINKS CONTAINING ALCOHOL DO YOU HAVE ON A TYPICAL DAY: PATIENT DOES NOT DRINK
SKIP TO QUESTIONS 9-10: 1
AUDIT-C TOTAL SCORE: 0

## 2024-07-26 ASSESSMENT — COGNITIVE AND FUNCTIONAL STATUS - GENERAL
DAILY ACTIVITIY SCORE: 21
CLIMB 3 TO 5 STEPS WITH RAILING: A LITTLE
DRESSING REGULAR LOWER BODY CLOTHING: A LITTLE
STANDING UP FROM CHAIR USING ARMS: A LITTLE
DRESSING REGULAR UPPER BODY CLOTHING: A LITTLE
TOILETING: A LITTLE
MOBILITY SCORE: 22

## 2024-07-26 ASSESSMENT — PATIENT HEALTH QUESTIONNAIRE - PHQ9
1. LITTLE INTEREST OR PLEASURE IN DOING THINGS: NOT AT ALL
2. FEELING DOWN, DEPRESSED OR HOPELESS: NOT AT ALL
SUM OF ALL RESPONSES TO PHQ9 QUESTIONS 1 & 2: 0

## 2024-07-26 ASSESSMENT — PAIN SCALES - GENERAL
PAINLEVEL_OUTOF10: 10 - WORST POSSIBLE PAIN
PAINLEVEL_OUTOF10: 3
PAINLEVEL_OUTOF10: 0 - NO PAIN

## 2024-07-26 ASSESSMENT — ACTIVITIES OF DAILY LIVING (ADL): LACK_OF_TRANSPORTATION: NO

## 2024-07-26 NOTE — NURSING NOTE
MRI STRESS        Stress protocol: Regadenoson  Regadenoson Dose: 0.4mg  Aminophylline Dose: 100mg     Resting Vitals:  BP: 142/80  HR: 63bpm  SPO2: 92% RA  Resting ECG: NSR with first degree block, PAC's, left axis deviation, and incomplete left bundle branch block     Stress:  0.4mg IV push Regadenoson:  1 min: /66 HR 67bpm 98% RA  symptoms: none  2 min: /66  HR 76 bpm 98% RA  symptoms: none     Reversal/Recovery:  100mg IV push Aminophylline:  1 min: /102   HR 62 bpm 92% RA symptoms: none  2 min: /90 HR 61 bpm 95% RA symptoms: none  4 min: /90  HR 67  92% RA symptoms: none  6 min: /84 HR 54 bpm 95% RA symptoms: none     Patients resting HR of 63 bpm pawel to a maximum of 76 bpm.  Resting BP of 142/80 pawel to a maximum of 160/84. Patients post stress EKG remained unchanged.  Pt transported back to [facility] after exam finished.

## 2024-07-26 NOTE — CARE PLAN
The patient's goals for the shift include Patient will get more than 5 hours of uninterrupted sleep.    The clinical goals for the shift include Patient will not have any symptoms of chest pain during this shift    Over the shift, the patient remained safe and without injury. He reported chest pain at about 5am. EKG shown sinus rhythm. The patient was given 925mg Tylenol, 5mg Oxycodone and lidocaine patch for his chest. Pain reduced from 10/10 to 7/10.     Patient remained NPO for a possible diagnosis today.

## 2024-07-26 NOTE — PROGRESS NOTES
Medicine Daily Progress Note    Nigel Chew is a 63 y.o. male on day 0 of admission presenting with Chest pain.    Subjective   No acute overnight events. Overnight, patient was hypertensive (systolic in 160s), improving this AM. Morning carvedilol held given bradycardia. Frequent PVCs on telemetry overnight. Patient seen and examined at bedside. States that he is having 5/10 chest pain, unrelieved with Tylenol/lidocaine patch/oxycodone 5 mg. Denies syncopal or pre-syncopal episodes. No other complaints today.     Objective   Physical Exam  Constitutional:       General: He is not in acute distress.     Appearance: Normal appearance. He is obese. He is not ill-appearing.   HENT:      Mouth/Throat:      Mouth: Mucous membranes are moist.      Pharynx: Oropharynx is clear.   Eyes:      Conjunctiva/sclera: Conjunctivae normal.      Pupils: Pupils are equal, round, and reactive to light.   Neck:      Vascular: No hepatojugular reflux or JVD.   Cardiovascular:      Rate and Rhythm: Normal rate and regular rhythm.      Pulses: Normal pulses.      Heart sounds: Normal heart sounds, S1 normal and S2 normal. No murmur heard.     No friction rub. No gallop.   Pulmonary:      Effort: Pulmonary effort is normal. No respiratory distress.      Breath sounds: Normal breath sounds. No wheezing.   Chest:      Chest wall: Tenderness (upon palpation of the sternum) present.   Abdominal:      General: Abdomen is flat. There is no distension.      Palpations: Abdomen is soft. There is no mass.      Tenderness: There is no abdominal tenderness. There is no guarding or rebound.   Musculoskeletal:         General: No swelling or deformity. Normal range of motion.      Right lower leg: No edema.      Left lower leg: No edema.   Skin:     General: Skin is warm and dry.   Neurological:      General: No focal deficit present.      Mental Status: He is alert and oriented to person, place, and time. Mental status is at baseline.      Comments:  Chronic absent motor function and diminished sensation in LUE and LLE.   Psychiatric:         Mood and Affect: Mood normal.         Behavior: Behavior normal.           Vitals: I/O:   Vitals:    07/26/24 0508   BP: 128/66   Pulse: 68   Resp: 20   Temp: 36.8 °C (98.2 °F)   SpO2: 98%        Temp  Min: 36.6 °C (97.9 °F)  Max: 37.4 °C (99.3 °F)  Pulse  Min: 59  Max: 75  BP  Min: 128/66  Max: 173/98  Resp  Min: 18  Max: 20  SpO2  Min: 93 %  Max: 98 % No intake or output data in the 24 hours ending 07/26/24 0701     Net IO Since Admission: 50 mL [07/26/24 0701]        Medications:  Scheduled Medications PRN Medications   aspirin, 81 mg, oral, Daily  atorvastatin, 80 mg, oral, Nightly  carvedilol, 3.125 mg, oral, BID  empagliflozin, 25 mg, oral, Daily  enoxaparin, 40 mg, subcutaneous, q24h  [Held by provider] furosemide, 20 mg, oral, BID  lidocaine, 1 patch, transdermal, Daily  polyethylene glycol, 17 g, oral, Daily  potassium chloride CR, 20 mEq, oral, Daily  potassium chloride CR, 20 mEq, oral, Once  sacubitriL-valsartan, 1 tablet, oral, BID  spironolactone, 12.5 mg, oral, Daily  tamsulosin, 0.4 mg, oral, Nightly      PRN medications: acetaminophen, ondansetron, oxyCODONE, sennosides-docusate sodium        Relevant Results:  Results from last 7 days   Lab Units 07/26/24  0514 07/24/24  2224   WBC AUTO x10*3/uL 5.4 6.8   HEMOGLOBIN g/dL 12.2* 13.1*   HEMATOCRIT % 40.0* 40.5*   PLATELETS AUTO x10*3/uL 195 218            Results from last 7 days   Lab Units 07/26/24  0514 07/25/24  0550 07/24/24  2224   SODIUM mmol/L 141  --  137   POTASSIUM mmol/L 3.5 3.5 2.9*   CHLORIDE mmol/L 106  --  100   CO2 mmol/L 25  --  26   BUN mg/dL 13  --  14   CREATININE mg/dL 0.79  --  0.78   CALCIUM mg/dL 8.4*  --  8.6     Results from last 7 days   Lab Units 07/26/24  0514 07/24/24  2224   ALK PHOS U/L 81 93   BILIRUBIN TOTAL mg/dL 0.7 0.9   PROTEIN TOTAL g/dL 6.2* 7.0   ALT U/L 8* 12   AST U/L 13 17      Results from last 7 days   Lab Units  07/24/24  2226   APTT seconds 32   INR  1.4*      Transthoracic Echo (TTE) Complete   Final Result      CT angio chest abdomen pelvis   Final Result   No thoracic or abdominal aortic aneurysm or acute aortic pathology.        Enlarged main pulmonary artery measuring up to 3.2 cm. Findings may   be seen with pulmonary arterial hypertension.        Cardiomegaly and small pericardial effusion.        Thickened interlobular septal markings and scattered ground-glass   opacities favored to represent pulmonary edema. Superimposed   infectious or inflammatory processes are not excluded.        Partially enhancing hepatic lesions which may represent hemangiomas,   however, are incompletely characterize. Recommend nonemergent MRI to   further characterize.        Age indeterminate compression deformity of the L5 vertebral body with   irregularity of the inferior endplate. Correlate with point   tenderness.        Additional findings as described above.        I personally reviewed the images/study and I agree with the findings   as stated by Cale Stevens MD. This study was interpreted at   [facility], [place], [place].        MACRO:   Critical Finding:  See findings. Notification was initiated on   7/25/2024 at 3:53 am by  Cale Stevens.  (**-OCF-**) Instructions:        Signed by: Evan Finkelstein 7/25/2024 4:32 AM   Dictation workstation:   [id number]      CT head wo IV contrast   Final Result   CT HEAD:   1. No acute intracranial abnormality or calvarial fracture.   2. Large area of encephalomalacia within the right MCA distribution,   consistent with history of prior stroke.             CT CERVICAL SPINE:   1. No acute fracture or traumatic malalignment of the cervical spine.   2. Multilevel degenerative changes of the cervical spine as described   above.        I personally reviewed the images/study and I agree with the findings   as stated by Cale Stevens MD. This study was  interpreted at   [facility], [place], [place].        MACRO:   None.        Signed by: Evan Finkelstein 7/25/2024 3:54 AM   Dictation workstation:   [id number]      CT cervical spine wo IV contrast   Final Result   CT HEAD:   1. No acute intracranial abnormality or calvarial fracture.   2. Large area of encephalomalacia within the right MCA distribution,   consistent with history of prior stroke.             CT CERVICAL SPINE:   1. No acute fracture or traumatic malalignment of the cervical spine.   2. Multilevel degenerative changes of the cervical spine as described   above.        I personally reviewed the images/study and I agree with the findings   as stated by Cale Stevens MD. This study was interpreted at   [facility], [place], [place].        MACRO:   None.        Signed by: Evan Finkelstein 7/25/2024 3:54 AM   Dictation workstation:   [id number]      XR chest 2 views   Final Result   No acute process.   Signed by Kwaku Magallanes MD             Assessment/Plan      Nigel Chew is a 63 y.o. male with PMH of HFrEF (EF 25-30% in 12/2023, now 21% on TTE 7/25/2024), nonischemic cardiomyopathy, hypertension, hyperlipidemia, and prior CVA (2012) with residual left-sided weakness who presented to [facility] for complaints of chest pain and nausea/vomiting. Patient had a 1-week history of substernal chest pain with associated nausea/vomiting and one episode of syncope. Patient reports not eating and drinking well over the past week and has not been taking home medications for that same time. LHC 12/2023 revealed no obstructive or nonobstructive CAD.  TTE 12/2023 with an EF of 25-30%, global ventricular hypokinesis, and moderate right atrial dilatation. [facility] was [facility] rhythm. First degree AV block was present. Non sustained VT. Non sustained SVT. Frequent, isolated PACs. Frequent, isolated PVCs  (7.9%). Presentation and workup upon admission to [facility] non-concerning for ACS with negative troponin and EKG without acute ischemic changes. Physical exam significant for pain upon palpation of the sternum without signs of heart failure exacerbation such as lower extremity edema, weight gain, and JVD. . CTA showing cardiomegaly and small pericardial effusion. TTE 7/24 showing EF 21%, global hypokinesis, and moderate pericardial effusion without concerns for cardiac tamponade. Concerns for dehydration at this time given positive orthostatics. Frequent PVCs overnight 7/26, ordered cardiac MRI 7/26 to further investigate NICM.     #Chest pain possibly secondary to unstable angina vs. costochondritis vs. GERD vs. pericarditis  -No concerns for ACS at this time Troponins in the ED WNL.  -EKG without signs of acute ischemia, similar [facility].  -Less likely pericarditis given no EKG changes and no pericardial friction rub on exam despite small pericardial effusion on TTE 7/24 (which was also present on previous echo)  -Pain worsening with palpation supports costochondritis diagnosis; however, patient denies recent URI.  -Patient denying history of GERD. Pain does not appear to worsen with foods or be exacerbated at night.  -S/p nitroglycerin x 2 in the ED with improvement in pain from 10/10 to 5/10.  Plan:  Will continue to monitor for s/s of GERD.  EKG PRN for worsening chest pain or CP that changes in character  Pain control with lidocaine patch, Tylenol 975 mg TID, oxycodone 5 mg q6h PRN.     #Heart Failure with reduced ejection fraction (HFrEF), EF 21%  #NICM  #Frequent PVCs  #Bradycardia  #HTN  -NYHA class II-III  -Does not appear to be volume overloaded on physical exam  -TTE 12/2023 @ [facility]: EF 25-30%, small pericardial effusion  -TTE 7/24 showing EF 21%, global hypokinesis, and moderate pericardial effusion without concerns for cardiac tamponade.  -[facility]24 [facility]: predominant rhythm was sinus rhythm. First degree AV block was present. Non sustained VT. Non sustained SVT. Frequent, isolated PACs. Frequent, isolated PVCs (7.9%).  -Home med regimen: Coreg 6.25 mg twice daily, Jardiance 25 daily, Entresto 50 mg twice daily, Aldactone 12.5 mg daily, Lasix 40 mg twice daily  -LHC at [facility] in 12/2023 showing no signs of obstructive or nonobstructive CAD.  -BNP: 137  -Concerns of bradycardia from nursing staff, determined to be a computer misread given frequent PVCs.  Plan:  Admission weight 201 lbs, dry weight 205 lbs.  Strict I/O, daily standing weights, Na-restricted diet.  Restart home med regimen: Coreg 3.125 twice daily, Jardiance 25 daily, Entresto 50 mg twice daily, Aldactone 12.5 mg daily  Holding home Lasix 40 mg BID  Continue home atorvastatin 80 mg  Cardiac stress MRI 7/26 to further evaluate NICM     #Syncope likely secondary to orthostatic hypotension  -Patient appears dehydrated on exam, consistent with nausea and vomiting for the past week  -Orthostatics positive  Plan:  S/p  mL bolus 7/25 without further episodes of syncope or pre-syncope     #Nausea w/o active vomiting  -Possibly secondary to dehydration and malnutrition over the past week.  Plan:  Zofran PRN     # Hypokalemia likely secondary to dehydration and malnutrition for 1 week  -Significant hypokalemia on presentation (2.9) and repeat 3.5 this a.m.  -S/p 40 mEq p.o. and 20 mEq IV in the ED  Plan:  Monitor daily RFPs  Replete as needed     #Partially enhancing hepatic lesion likely hemangiomas on CT A/P  -Incompletely characterized on CT A/P w/o contrast 7/25.  -Recommend outpatient MRI to further characterize.     #Prior CVA (2012) with residual left-sided weakness     #BPH  -Continue home Flomax 0.4 mg daily     IVF: PRN  Electrolytes: K>4, Mg>2  Access: PIV    Diet: NPO Diet; Effective midnight   DVT Ppx: Lovenox  GI Ppx: Not indicated  Bowel regimen: Miralax daily, Kerrie-colace  PRN  Pain control: lidocaine patch, Tylenol 975 mg TID, oxycodone 5 mg q6h PRN    Emergency Contact: Extended Emergency Contact Information  Primary Emergency Contact: DONOVAN HINDS  Mobile Phone: [phone]  Relation: Daughter  Preferred language: English   needed? No  Secondary Emergency Contact: Roel Hinds  Home Phone: [phone]  Relation: Child   Code status: Full Code (confirmed on admission)  PT/OT: Ordered evaluation  Disposition: Home    Chris Gilbert MD   PGY-1 Internal Medicine

## 2024-07-26 NOTE — DISCHARGE INSTRUCTIONS
"Dear Mr. Chew,    You were admitted for chest pain and syncope. You had an MRI of you heart which showed that you do not likely have any blockages. However that MRI also showed that your heart muscles are abnormal and there is an \"infiltrative process\". Your chest pain is likely from bones and muscles. That is something that you will need to follow-up with [facility] cardiologist (We requested a [facility] cardiology follow-up for you). For your syncope, you might have been dry from the nausea that has resolved, we gave some fluids and it improved.     Please resume all of your home medications on discharge, except furosemide and potassium which you can resume 7/30/2024.     We also sent lidocaine patch for your bone/muscle pain to your [facility] pharmacy.  "

## 2024-07-26 NOTE — PROGRESS NOTES
07/26/24 1059   Discharge Planning   Living Arrangements Children   Support Systems Children   Assistance Needed None   Type of Residence Private residence   Number of Stairs to Enter Residence 5   Number of Stairs Within Residence 12   Do you have animals or pets at home? No   Who is requesting discharge planning? Provider   Home or Post Acute Services None   Expected Discharge Disposition Home   Does the patient need discharge transport arranged? No   Financial Resource Strain   How hard is it for you to pay for the very basics like food, housing, medical care, and heating? Not very   Housing Stability   In the last 12 months, was there a time when you were not able to pay the mortgage or rent on time? N   In the past 12 months, how many times have you moved where you were living? 1   At any time in the past 12 months, were you homeless or living in a shelter (including now)? N   Transportation Needs   In the past 12 months, has lack of transportation kept you from medical appointments or from getting medications? no   In the past 12 months, has lack of transportation kept you from meetings, work, or from getting things needed for daily living? No   Patient Choice   Provider Choice list and CMS website (https://medicare.gov/care-compare#search) for post-acute Quality and Resource Measure Data were provided and reviewed with: Patient   Patient / Family choosing to utilize agency / facility established prior to hospitalization No     Transitional Care Coordinator Note:  7/26/2024@9:00 Met with patient to introduce myself, role and discuss discharge planning s/p admission.  Patient lives with children  Independent in all ADL's. Does  require assist devices for ambulation. Demographics and contact information confirmed.  Will continue to monitor patient for all home going needs.  Ahsan Chang RN [facility] [phone]              Previous Home Care  None   DME  Cane   Pharmacy  [facility] -   Falls:  patient had had a recent fall   PCP  TABITHA HURTADO   O2/Cpap-None   Dialysis  None   Transportation at discharge- Has transport

## 2024-07-27 NOTE — DISCHARGE SUMMARY
Discharge Diagnosis  Chest pain    Issues Requiring Follow-Up  PCP: nonemergent MRI to further characterize hepatic lesion seen on CTA 7/24  Cardiology (at [facility]): For HFrEF, NICM follow-up, Cardiac MRI results (see below)    Test Results Pending At Discharge  Pending Labs       No current pending labs.            Hospital Course  Nigel Chew is a 63 y.o. male with PMH of HFrEF (EF 25-30% in 12/2023, now 21% on TTE 7/25/2024), nonischemic cardiomyopathy, hypertension, hyperlipidemia, and prior CVA (2012) with residual left-sided weakness who presented to [facility] for complaints of chest pain and nausea/vomiting. Patient had a 1-week history of substernal chest pain with associated nausea/vomiting and one episode of syncope. Patient reports not eating and drinking well over the past week and has not been taking home medications for that same time. Presentation and workup non-concerning for ACS with negative troponin and EKG without acute ischemic changes. [facility] Patient has a history of HrEF diagnosed in 12/2023.  LHC at the same time revealed no obstructive or nonobstructive CAD.  TTE 12/2023 with an EF of 25-30%, global ventricular hypokinesis, and moderate right atrial dilatation. May 2024 [facility] the [facility]: predominant rhythm was sinus rhythm. First degree AV block was present. Non sustained VT. Non sustained SVT. Frequent, isolated PACs. Frequent, isolated PVCs (7.9%).    Physical exam on admission significant for pain upon palpation of the sternum without signs of heart failure exacerbation such as lower extremity edema, weight gain, and JVD. . CTA showing cardiomegaly and small pericardial effusion. TTE showing EF 21%, global hypokinesis, and moderate pericardial effusion without concerns for cardiac tamponade.  Concerns for dehydration upon admission given positive orthostatics.     On the floor, patient was given 500 mL bolus  LR due to concerns for dehydration with positive orthostatics. Home Lasix was held. Restarted home GDMT. Pain persisted throughout the night. Cardiac MRI ordered 7/26, showing extensive delayed enhancement which predominantly appears to be mid myocardial throughout the mid to basal segments as described and greatest within the lateral wall overall in keeping with a nonspecific pattern. Some areas of the delayed enhancement do appear to involve the subendocardium in the lateral wall and areas of infarction are difficult to entirely exclude. The diffusely increased T1 mapping times and upper limits of normal to borderline increased T2 mapping times are suggestive of an underlying infiltrative process. Approximate scar size = 21%.    Patient's chest pain began to gradually improve on analgesics. No syncopal or pre-syncopal events throughout his admission. Patient was medically stable and appropriate for discharge. Patient agreeable to discharge and expresses agreement to follow-up with outpatient providers.     Pertinent Physical Exam At Time of Discharge  Physical Exam  Constitutional:       General: He is not in acute distress.     Appearance: Normal appearance. He is obese. He is not ill-appearing.   HENT:      Mouth/Throat:      Mouth: Mucous membranes are moist.      Pharynx: Oropharynx is clear.   Eyes:      Conjunctiva/sclera: Conjunctivae normal.      Pupils: Pupils are equal, round, and reactive to light.   Neck:      Vascular: No hepatojugular reflux or JVD.   Cardiovascular:      Rate and Rhythm: Normal rate and regular rhythm.      Pulses: Normal pulses.      Heart sounds: Normal heart sounds, S1 normal and S2 normal. No murmur heard.     No friction rub. No gallop.   Pulmonary:      Effort: Pulmonary effort is normal. No respiratory distress.      Breath sounds: Normal breath sounds. No wheezing.   Chest:      Chest wall: Tenderness (upon palpation of the sternum) present.   Abdominal:      General:  "Abdomen is flat. There is no distension.      Palpations: Abdomen is soft. There is no mass.      Tenderness: There is no abdominal tenderness. There is no guarding or rebound.   Musculoskeletal:         General: No swelling or deformity. Normal range of motion.      Right lower leg: No edema.      Left lower leg: No edema.   Skin:     General: Skin is warm and dry.   Neurological:      General: No focal deficit present.      Mental Status: He is alert and oriented to person, place, and time. Mental status is at baseline.      Comments: Chronic absent motor function and diminished sensation in LUE and LLE.   Psychiatric:         Mood and Affect: Mood normal.         Behavior: Behavior normal.         Vital Signs At Time of Discharge:  /80 (BP Location: Right arm, Patient Position: Lying)   Pulse 57   Temp 37 °C (98.6 °F) (Temporal)   Resp 18   Ht 1.753 m (5' 9\")   Wt 93.9 kg (207 lb 0.2 oz)   SpO2 95%   BMI 30.57 kg/m²      Home-going Medications     Medication List      CHANGE how you take these medications     furosemide 20 mg tablet; Commonly known as: Lasix; Take 1 tablet (20 mg)   by mouth 2 times daily (morning and late afternoon). Resume 7/30; What   changed: additional instructions   potassium chloride CR 20 mEq ER tablet; Commonly known as: Klor-Con M20;   Take 1 tablet (20 mEq) by mouth once daily. Resume 7/30; What changed:   additional instructions     CONTINUE taking these medications     acetaminophen-codeine 300-30 mg tablet; Commonly known as: Tylenol w/   Codeine #3   aspirin 81 mg EC tablet   atorvastatin 80 mg tablet; Commonly known as: Lipitor   carvedilol 3.125 mg tablet; Commonly known as: Coreg   cholecalciferol 50 MCG (2000 UT) tablet; Commonly known as: Vitamin D-3   Entresto 24-26 mg tablet; Generic drug: sacubitriL-valsartan   Jardiance 25 mg; Generic drug: empagliflozin   magnesium oxide 420 mg tablet; Commonly known as: Mag-Ox   naproxen 500 mg tablet; Commonly known as: " Naprosyn   spironolactone 25 mg tablet; Commonly known as: Aldactone   tamsulosin 0.4 mg 24 hr capsule; Commonly known as: Flomax       Outpatient Follow-Up  No future appointments.    Chris Gilbert MD  PGY-1 Internal Medicine

## 2024-07-28 LAB
ATRIAL RATE: 63 BPM
P AXIS: 85 DEGREES
P OFFSET: 158 MS
P ONSET: 87 MS
PR INTERVAL: 254 MS
Q ONSET: 214 MS
QRS COUNT: 11 BEATS
QRS DURATION: 124 MS
QT INTERVAL: 460 MS
QTC CALCULATION(BAZETT): 470 MS
QTC FREDERICIA: 467 MS
R AXIS: -26 DEGREES
T AXIS: 125 DEGREES
T OFFSET: 444 MS
VENTRICULAR RATE: 63 BPM